# Patient Record
Sex: MALE | Race: WHITE | NOT HISPANIC OR LATINO | Employment: OTHER | URBAN - METROPOLITAN AREA
[De-identification: names, ages, dates, MRNs, and addresses within clinical notes are randomized per-mention and may not be internally consistent; named-entity substitution may affect disease eponyms.]

---

## 2017-01-06 ENCOUNTER — TELEPHONE (OUTPATIENT)
Dept: ORTHOPEDIC SURGERY | Facility: CLINIC | Age: 62
End: 2017-01-06

## 2017-12-04 ENCOUNTER — OFFICE VISIT (OUTPATIENT)
Dept: INTERNAL MEDICINE | Facility: CLINIC | Age: 62
End: 2017-12-04

## 2017-12-04 VITALS
WEIGHT: 171 LBS | SYSTOLIC BLOOD PRESSURE: 116 MMHG | DIASTOLIC BLOOD PRESSURE: 62 MMHG | BODY MASS INDEX: 25.33 KG/M2 | HEIGHT: 69 IN

## 2017-12-04 DIAGNOSIS — M54.16 LUMBAR RADICULOPATHY, ACUTE: Primary | ICD-10-CM

## 2017-12-04 PROCEDURE — 99213 OFFICE O/P EST LOW 20 MIN: CPT | Performed by: INTERNAL MEDICINE

## 2017-12-04 RX ORDER — PREDNISONE 10 MG/1
TABLET ORAL
Qty: 36 TABLET | Refills: 0 | Status: SHIPPED | OUTPATIENT
Start: 2017-12-04 | End: 2017-12-21

## 2017-12-04 NOTE — PROGRESS NOTES
"Subjective   Quan Holloway is a 62 y.o. male.     History of Present Illness   /62  Ht 69\" (175.3 cm)  Wt 171 lb (77.6 kg)  BMI 25.25 kg/m2  Quan Adrianv62 y.o.male presents to the office c/o intermittent rig ht hip pain shooting down below the knee with weight bearing.  S-ms started 11/29/2017. Precipitating event: none. Patient describes pain as sharp, severe and intermittent. Intensity of the s-ms: severe. Patient denies  numbness or tingling. States that the pain had been episodic. He has no pain at night in the bed. Able to lay on the right side. States that the worst pain is when he is standing. The pain improves with walking. The weight bearing causes the pain. Comfortable sitting, but has to find a position to be comfortable.  Patient  has had no similar episode in a past. Went to [facility].NO imaging studies done.  Treatments tried: Medrol dose pack , Cyclobenzaprine and Ibuprofen with relief.  PMH is significant for C spine DDD, PVD, current smoker.    No current outpatient prescriptions on file.    The following portions of the patient's history were reviewed and updated as appropriate: allergies, current medications, past family history, past medical history, past social history, past surgical history and problem list.    Review of Systems   Constitutional: Negative for chills and fever.   Eyes: Negative for pain and redness.   Respiratory: Negative for cough and shortness of breath.    Cardiovascular: Negative for chest pain and leg swelling.   Genitourinary: Positive for flank pain (right hip pain radiating down leg).   Neurological: Negative for dizziness and headaches.       Objective   Physical Exam   Constitutional: He is oriented to person, place, and time. He appears well-developed and well-nourished.   HENT:   Head: Normocephalic and atraumatic.   Right Ear: Tympanic membrane, external ear and ear canal normal.   Left Ear: Tympanic membrane, external ear and ear canal normal. "   Nose: Nose normal. Right sinus exhibits no maxillary sinus tenderness and no frontal sinus tenderness. Left sinus exhibits no maxillary sinus tenderness and no frontal sinus tenderness.   Mouth/Throat: Uvula is midline, oropharynx is clear and moist and mucous membranes are normal.   Eyes: Conjunctivae are normal. Pupils are equal, round, and reactive to light. Right eye exhibits no discharge. Left eye exhibits no discharge. No scleral icterus. Left eye nystagmus: lateral strobismus left eye.   Neck: Neck supple. No JVD present.   Cardiovascular: Normal rate, regular rhythm and normal heart sounds.  Exam reveals no gallop and no friction rub.    No murmur heard.  Pulmonary/Chest: Effort normal and breath sounds normal. He has no wheezes. He has no rales.   Musculoskeletal: He exhibits no edema.   Negative straight leg raising test, painless full ROM right hip and knee   Lymphadenopathy:     He has no cervical adenopathy.   Neurological: He is alert and oriented to person, place, and time. No cranial nerve deficit.   Skin: Skin is warm and dry. No rash noted.   Psychiatric: He has a normal mood and affect. His behavior is normal.   Vitals reviewed.      Assessment/Plan   Quan was seen today for hip pain.    Diagnoses and all orders for this visit:    Lumbar radiculopathy, acute  -     predniSONE (DELTASONE) 10 MG tablet; QID x 3d, then TID x 3d, then BID x 5d, then qd x5d    Acute lumbar radiculopathy - given explanations. Will stop Methylprednisolone and start Prednisone ( to be taken with meals) tapering dose. Continue muscle relaxant. Try heat, over the counter Salonpas patches or Tiger [place], or Icy Hot - apply to the right lower back. Try stretching exercise.

## 2017-12-04 NOTE — PATIENT INSTRUCTIONS
Acute lumbar radiculopathy - given explanations. Will stop Methylprednisolone and start Prednisone ( to be taken with meals) tapering dose. Continue muscle relaxant. Try heat, over the counter Salonpas patches or Tiger [place], or Icy Hot - apply to the right lower back. Try stretching exercise.

## 2017-12-11 ENCOUNTER — HOSPITAL ENCOUNTER (OUTPATIENT)
Dept: GENERAL RADIOLOGY | Facility: HOSPITAL | Age: 62
Discharge: HOME | End: 2017-12-11

## 2017-12-11 ENCOUNTER — OFFICE VISIT (OUTPATIENT)
Dept: INTERNAL MEDICINE | Facility: CLINIC | Age: 62
End: 2017-12-11

## 2017-12-11 ENCOUNTER — HOSPITAL ENCOUNTER (OUTPATIENT)
Dept: GENERAL RADIOLOGY | Facility: HOSPITAL | Age: 62
Discharge: HOME | End: 2017-12-11
Admitting: INTERNAL MEDICINE

## 2017-12-11 VITALS
WEIGHT: 172 LBS | BODY MASS INDEX: 25.4 KG/M2 | HEART RATE: 72 BPM | DIASTOLIC BLOOD PRESSURE: 100 MMHG | SYSTOLIC BLOOD PRESSURE: 162 MMHG | OXYGEN SATURATION: 97 %

## 2017-12-11 DIAGNOSIS — R39.16 BENIGN PROSTATIC HYPERPLASIA (BPH) WITH STRAINING ON URINATION: ICD-10-CM

## 2017-12-11 DIAGNOSIS — R03.0 ELEVATED BLOOD PRESSURE READING WITHOUT DIAGNOSIS OF HYPERTENSION: ICD-10-CM

## 2017-12-11 DIAGNOSIS — M54.16 LUMBAR RADICULOPATHY, ACUTE: ICD-10-CM

## 2017-12-11 DIAGNOSIS — N40.1 BENIGN PROSTATIC HYPERPLASIA (BPH) WITH STRAINING ON URINATION: ICD-10-CM

## 2017-12-11 DIAGNOSIS — M54.16 LUMBAR RADICULOPATHY, ACUTE: Primary | ICD-10-CM

## 2017-12-11 PROCEDURE — 99214 OFFICE O/P EST MOD 30 MIN: CPT | Performed by: INTERNAL MEDICINE

## 2017-12-11 PROCEDURE — 73502 X-RAY EXAM HIP UNI 2-3 VIEWS: CPT

## 2017-12-11 PROCEDURE — 72100 X-RAY EXAM L-S SPINE 2/3 VWS: CPT

## 2017-12-11 RX ORDER — TAMSULOSIN HYDROCHLORIDE 0.4 MG/1
1 CAPSULE ORAL NIGHTLY
Qty: 30 CAPSULE | Refills: 3 | Status: SHIPPED | OUTPATIENT
Start: 2017-12-11 | End: 2018-03-06 | Stop reason: SDUPTHER

## 2017-12-11 RX ORDER — MELOXICAM 15 MG/1
15 TABLET ORAL DAILY
Qty: 30 TABLET | Refills: 3 | Status: SHIPPED | OUTPATIENT
Start: 2017-12-11 | End: 2017-12-21

## 2017-12-11 NOTE — PROGRESS NOTES
Subjective   Quan Holloway is a 62 y.o. male.     History of Present Illness   Quan Holloway 62 y.o. male presents today for lumbar radiculopathy f/u. Last was seen on 12/04/2017 and on that visit medication was changed due to new onset of symptoms.We had started prednisone.  Patient is compliant with treatment and reports side effects.He states that his urination is difficult with some pain, also insomnia. Patient reports no change in symptoms with medication change. After very mild initial improvement his lower back and right leg pain got much worse since 4 days ago. As of yesterday the pain goes below the right knee.He states that the pain gets worse as he bears weight. Unable to find comfortable position. Minimal improvement with topical use of OTC Tiger balm.  The following portions of the patient's history were reviewed and updated as appropriate: allergies, current medications, past family history, past medical history, past social history, past surgical history and problem list.    Review of Systems   HENT: Negative for congestion, drooling, ear discharge and ear pain.    Cardiovascular: Negative for chest pain, palpitations and leg swelling.   Musculoskeletal: Positive for back pain and gait problem. Negative for neck pain and neck stiffness.   Neurological: Negative for dizziness, syncope, numbness and headaches.   Psychiatric/Behavioral: Negative for agitation, behavioral problems and hallucinations.       Objective   Physical Exam   Constitutional: He is oriented to person, place, and time. He appears well-developed and well-nourished.   HENT:   Head: Normocephalic and atraumatic.   Right Ear: Tympanic membrane, external ear and ear canal normal.   Left Ear: Tympanic membrane, external ear and ear canal normal.   Nose: Nose normal. Right sinus exhibits no maxillary sinus tenderness and no frontal sinus tenderness. Left sinus exhibits no maxillary sinus tenderness and no frontal sinus tenderness.    Mouth/Throat: Uvula is midline, oropharynx is clear and moist and mucous membranes are normal.   Eyes: Conjunctivae and EOM are normal. Pupils are equal, round, and reactive to light. Right eye exhibits no discharge. Left eye exhibits no discharge. No scleral icterus.   Neck: Neck supple. No JVD present.   Cardiovascular: Normal rate, regular rhythm and normal heart sounds.  Exam reveals no gallop and no friction rub.    No murmur heard.  Pulmonary/Chest: Effort normal and breath sounds normal. He has no wheezes. He has no rales.   Musculoskeletal: He exhibits no edema.   Lymphadenopathy:     He has no cervical adenopathy.   Neurological: He is alert and oriented to person, place, and time. No cranial nerve deficit.   Skin: Skin is warm and dry. No rash noted.   Psychiatric: He has a normal mood and affect. His behavior is normal.   Vitals reviewed.      Assessment/Plan   Quan was seen today for back pain.    Diagnoses and all orders for this visit:    Lumbar radiculopathy, acute  -     XR Hip With or Without Pelvis 2 - 3 View Left  -     XR Spine Lumbar 4+ View  -     meloxicam (MOBIC) 15 MG tablet; Take 1 tablet by mouth Daily.      1. Lumbar radiculopathy vs ileo-tibial s-m. Had failed oral prednisone. Will treat with Meloxicam and refer to PT. Will check right hip and lumbar spine x-rays. Continue topical applications of over the counter Tiger [place] and heat. Stay off work.  2. High BP - due to use of Prednisone and pain. Low salt diet. Reevaluate in a week.  3. BPH with prostatismus - in a past had good response to Tamsulosin, will restart.

## 2017-12-18 ENCOUNTER — TELEPHONE (OUTPATIENT)
Dept: INTERNAL MEDICINE | Facility: CLINIC | Age: 62
End: 2017-12-18

## 2017-12-18 NOTE — TELEPHONE ENCOUNTER
Patient was informed about possible MRI if pain has not improved also referral for Dr Dowling with orthopedic group for spine issues. Patient states he has an appointment on Thursday with Dr gilliland and will discuss then. FMLA, short term disability paperwork is at my desk to hold for Dr gilliland to review. Patient is aware if paperwork can not be filled by Dr Gilliland he may be referred to a disability speciality doctor.

## 2017-12-18 NOTE — TELEPHONE ENCOUNTER
----- Message from Aziza Maki sent at 12/18/2017  9:25 AM EST -----  Pt dropped off forms for short-term disability to be filled out. Let him know  would look over them and get back with him if there were any questions. He has an appt with her on Thursday. His call back is [phone]. Thank you!

## 2017-12-19 DIAGNOSIS — M54.16 LUMBAR RADICULOPATHY, ACUTE: Primary | ICD-10-CM

## 2017-12-21 ENCOUNTER — OFFICE VISIT (OUTPATIENT)
Dept: INTERNAL MEDICINE | Facility: CLINIC | Age: 62
End: 2017-12-21

## 2017-12-21 VITALS
HEIGHT: 69 IN | SYSTOLIC BLOOD PRESSURE: 120 MMHG | DIASTOLIC BLOOD PRESSURE: 70 MMHG | BODY MASS INDEX: 25.62 KG/M2 | WEIGHT: 173 LBS | RESPIRATION RATE: 14 BRPM

## 2017-12-21 DIAGNOSIS — R39.16 BENIGN PROSTATIC HYPERPLASIA (BPH) WITH STRAINING ON URINATION: ICD-10-CM

## 2017-12-21 DIAGNOSIS — M25.361 KNEE INSTABILITY, RIGHT: ICD-10-CM

## 2017-12-21 DIAGNOSIS — M54.16 LUMBAR RADICULOPATHY, ACUTE: Primary | ICD-10-CM

## 2017-12-21 DIAGNOSIS — N40.1 BENIGN PROSTATIC HYPERPLASIA (BPH) WITH STRAINING ON URINATION: ICD-10-CM

## 2017-12-21 PROBLEM — R03.0 ELEVATED BLOOD PRESSURE READING WITHOUT DIAGNOSIS OF HYPERTENSION: Status: RESOLVED | Noted: 2017-12-11 | Resolved: 2017-12-21

## 2017-12-21 PROCEDURE — 99214 OFFICE O/P EST MOD 30 MIN: CPT | Performed by: INTERNAL MEDICINE

## 2017-12-21 NOTE — PROGRESS NOTES
Subjective   Quan Holloway is a 62 y.o. male.     History of Present Illness   Quan Holloway 62 y.o. male presents today for lower back pain f/u. Last was seen on 12/11/2017 and on that visit medication was changed due to inadequate control.We had started Meloxiacan and had referred patient to PT..  Patient is compliant with treatment and denies  side effects. Patient reports no change in symptoms with medication change. He continues suffering with right lower back and right lateral leg pain. The only comfortable position is crowching on the floor with right hip and knee fully flexed and lower spine extended. Had traction and dry needling with very temporary relief - usually feels great for 15 min, but then on his way home pain returns. States that the last night was the first night when he was able to sleep all night. No tingling or numbness. Patient states that he is not able to stand and bear weight for any length of time. Had 5 sessions of PT so far. The pain is dull ache and shooting down the leg. Some instability in the right knee, that he attributes to the pain.  Quan Holloway 62 y.o. male presents today for BPH with prostatismus f/u. Last was seen on 12/11/2017 and on that visit medication was changed due to new onset of symptoms.We had started Tamsulosin.  Patient is compliant with treatment and denies  side effects. Patient reports resolution of the symptoms.     The following portions of the patient's history were reviewed and updated as appropriate: allergies, current medications, past family history, past medical history, past social history, past surgical history and problem list.    Review of Systems   Constitutional: Negative for chills and fever.   Eyes: Negative for pain and redness.   Respiratory: Negative for cough and shortness of breath.    Cardiovascular: Negative for chest pain and leg swelling.   Musculoskeletal: Positive for back pain, gait problem and myalgias.   Neurological: Negative  for dizziness and headaches.       Objective   Physical Exam   Constitutional: He is oriented to person, place, and time. He appears well-developed and well-nourished.   HENT:   Head: Normocephalic and atraumatic.   Right Ear: Tympanic membrane, external ear and ear canal normal.   Left Ear: Tympanic membrane, external ear and ear canal normal.   Nose: Nose normal. Right sinus exhibits no maxillary sinus tenderness and no frontal sinus tenderness. Left sinus exhibits no maxillary sinus tenderness and no frontal sinus tenderness.   Mouth/Throat: Uvula is midline, oropharynx is clear and moist and mucous membranes are normal.   Eyes: Conjunctivae and EOM are normal. Pupils are equal, round, and reactive to light. Right eye exhibits no discharge. Left eye exhibits no discharge. No scleral icterus.   Neck: Neck supple. No JVD present.   Cardiovascular: Normal rate, regular rhythm and normal heart sounds.  Exam reveals no gallop and no friction rub.    No murmur heard.  Pulmonary/Chest: Effort normal and breath sounds normal. He has no wheezes. He has no rales.   Musculoskeletal: He exhibits no edema.   Lymphadenopathy:     He has no cervical adenopathy.   Neurological: He is alert and oriented to person, place, and time. No cranial nerve deficit.   Skin: Skin is warm and dry. No rash noted.   Psychiatric: He has a normal mood and affect. His behavior is normal.   Vitals reviewed.      Assessment/Plan   Quan was seen today for leg pain.    Diagnoses and all orders for this visit:    Lumbar radiculopathy, acute  -     Ambulatory Referral to Orthopedic Surgery    Benign prostatic hyperplasia (BPH) with straining on urination    Knee instability, right  -     XR Knee Standing Right      1. Lumbar radiculopathy with poor response to treatment. Awaiting MRI. Lack of response to medication and to the PT is of concern. Will refer to ortho. Will stop Meloxicam and try Diclofenac.  2. Instability in the right knee - will check  x-ray to r/o osteoarthritis.  3. BPH - responded well to medication, continue Tamsulosin.

## 2017-12-26 ENCOUNTER — HOSPITAL ENCOUNTER (OUTPATIENT)
Dept: MRI IMAGING | Facility: HOSPITAL | Age: 62
Discharge: HOME | End: 2017-12-26
Admitting: INTERNAL MEDICINE

## 2017-12-26 ENCOUNTER — HOSPITAL ENCOUNTER (OUTPATIENT)
Dept: GENERAL RADIOLOGY | Facility: HOSPITAL | Age: 62
Discharge: HOME | End: 2017-12-26

## 2017-12-26 DIAGNOSIS — M54.16 LUMBAR RADICULOPATHY, ACUTE: ICD-10-CM

## 2017-12-26 PROCEDURE — 72148 MRI LUMBAR SPINE W/O DYE: CPT

## 2017-12-26 PROCEDURE — 73560 X-RAY EXAM OF KNEE 1 OR 2: CPT

## 2017-12-27 DIAGNOSIS — M54.16 LUMBAR RADICULOPATHY, ACUTE: Primary | ICD-10-CM

## 2018-01-02 ENCOUNTER — OFFICE VISIT (OUTPATIENT)
Dept: INTERNAL MEDICINE | Facility: CLINIC | Age: 63
End: 2018-01-02

## 2018-01-02 VITALS
DIASTOLIC BLOOD PRESSURE: 68 MMHG | SYSTOLIC BLOOD PRESSURE: 118 MMHG | WEIGHT: 173 LBS | BODY MASS INDEX: 25.62 KG/M2 | HEIGHT: 69 IN

## 2018-01-02 DIAGNOSIS — M54.16 LUMBAR RADICULOPATHY, ACUTE: Primary | ICD-10-CM

## 2018-01-02 PROCEDURE — 99213 OFFICE O/P EST LOW 20 MIN: CPT | Performed by: INTERNAL MEDICINE

## 2018-01-02 RX ORDER — PREDNISONE 10 MG/1
20 TABLET ORAL 2 TIMES DAILY WITH MEALS
Qty: 20 TABLET | Refills: 0 | Status: SHIPPED | OUTPATIENT
Start: 2018-01-02 | End: 2018-01-12

## 2018-01-02 RX ORDER — GABAPENTIN 100 MG/1
100 CAPSULE ORAL 3 TIMES DAILY
Qty: 90 CAPSULE | Refills: 0 | Status: SHIPPED | OUTPATIENT
Start: 2018-01-02 | End: 2018-01-12

## 2018-01-02 NOTE — PATIENT INSTRUCTIONS
Lumbar radiculopathy - had failed PT, NSAIDs, steroids and muscle relaxant. MRI reviewed. Has appointment with Samantha on 01/10/2018, I had recommended for the patient to call his office daily and see if they have cancellation list. Will try one more round of steroids. Try over the counter Salonpas patches or creams. Try Gabapentin 3 times a day, start at night and in the late afternoon. Warned that medication might make him sleepy.

## 2018-01-02 NOTE — PROGRESS NOTES
Subjective   Quan Holloway is a 62 y.o. male. Here for lumbar radiculopathy    History of Present Illness   Quan Holloway 62 y.o. male presents today for lumbar radiculopathy f/u. Last was seen on 12/21/2017 and on that visit medication was changed due to plans for epidural ingections.We had discontinued Diclofenac due to plans for epidurals and due to lack of efefct..  Patient is compliant with treatment . He is not able to sleep at night due to the pain. Complains of the severe pain, unrelenting, worse as the day progresses. Pain radiated down the right eleg.  The following portions of the patient's history were reviewed and updated as appropriate: allergies, current medications, past family history, past medical history, past social history, past surgical history and problem list.    Review of Systems   Constitutional: Negative for chills and fever.   Eyes: Negative for pain and redness.   Respiratory: Negative for cough and shortness of breath.    Cardiovascular: Negative for chest pain and leg swelling.   Musculoskeletal: Positive for joint swelling (right knee pain).   Neurological: Negative for dizziness and headaches.       Objective   Physical Exam   Constitutional: He is oriented to person, place, and time. He appears well-developed and well-nourished.   HENT:   Head: Normocephalic and atraumatic.   Right Ear: Tympanic membrane, external ear and ear canal normal.   Left Ear: Tympanic membrane, external ear and ear canal normal.   Nose: Nose normal. Right sinus exhibits no maxillary sinus tenderness and no frontal sinus tenderness. Left sinus exhibits no maxillary sinus tenderness and no frontal sinus tenderness.   Mouth/Throat: Uvula is midline, oropharynx is clear and moist and mucous membranes are normal.   Eyes: Conjunctivae and EOM are normal. Pupils are equal, round, and reactive to light. Right eye exhibits no discharge. Left eye exhibits no discharge. No scleral icterus.   Neck: Neck supple. No  JVD present.   Cardiovascular: Normal rate, regular rhythm and normal heart sounds.  Exam reveals no gallop and no friction rub.    No murmur heard.  Pulmonary/Chest: Effort normal and breath sounds normal. He has no wheezes. He has no rales.   Musculoskeletal: He exhibits no edema.   Lymphadenopathy:     He has no cervical adenopathy.   Neurological: He is alert and oriented to person, place, and time. No cranial nerve deficit.   Skin: Skin is warm and dry. No rash noted.   Psychiatric: He has a normal mood and affect. His behavior is normal.   Vitals reviewed.      Assessment/Plan   Diagnoses and all orders for this visit:    Lumbar radiculopathy, acute    Other orders  -     predniSONE (DELTASONE) 10 MG tablet; Take 2 tablets by mouth 2 (Two) Times a Day With Meals.    Lumbar radiculopathy - had failed PT, NSAIDs, steroids and muscle relaxant. MRI reviewed. Has appointment with Samantha on 01/10/2018, I had recommended for the patient to call his office daily and see if they have cancellation list. Will try one more round of steroids. Try over the counter Salonpas patches or creams. Try Gabapentin 3 times a day, start at night and in the late afternoon. Warned that medication might make him sleepy.

## 2018-01-12 ENCOUNTER — ANESTHESIA EVENT (OUTPATIENT)
Dept: PAIN MEDICINE | Facility: HOSPITAL | Age: 63
End: 2018-01-12

## 2018-01-12 ENCOUNTER — ANESTHESIA (OUTPATIENT)
Dept: PAIN MEDICINE | Facility: HOSPITAL | Age: 63
End: 2018-01-12

## 2018-01-12 ENCOUNTER — TRANSCRIBE ORDERS (OUTPATIENT)
Dept: PAIN MEDICINE | Facility: HOSPITAL | Age: 63
End: 2018-01-12

## 2018-01-12 ENCOUNTER — HOSPITAL ENCOUNTER (OUTPATIENT)
Dept: GENERAL RADIOLOGY | Facility: HOSPITAL | Age: 63
Discharge: HOME | End: 2018-01-12

## 2018-01-12 ENCOUNTER — HOSPITAL ENCOUNTER (OUTPATIENT)
Dept: PAIN MEDICINE | Facility: HOSPITAL | Age: 63
Discharge: HOME | End: 2018-01-12
Admitting: INTERNAL MEDICINE

## 2018-01-12 VITALS
RESPIRATION RATE: 16 BRPM | TEMPERATURE: 97.9 F | SYSTOLIC BLOOD PRESSURE: 116 MMHG | OXYGEN SATURATION: 98 % | HEIGHT: 68 IN | BODY MASS INDEX: 26.67 KG/M2 | DIASTOLIC BLOOD PRESSURE: 75 MMHG | HEART RATE: 68 BPM | WEIGHT: 176 LBS

## 2018-01-12 DIAGNOSIS — R52 PAIN: ICD-10-CM

## 2018-01-12 DIAGNOSIS — M54.16 LUMBAR RADICULOPATHY, ACUTE: ICD-10-CM

## 2018-01-12 DIAGNOSIS — M54.16 LUMBAR RADICULOPATHY, ACUTE: Primary | ICD-10-CM

## 2018-01-12 PROCEDURE — 77003 FLUOROGUIDE FOR SPINE INJECT: CPT

## 2018-01-12 PROCEDURE — 25010000002 METHYLPREDNISOLONE PER 80 MG: Performed by: ANESTHESIOLOGY

## 2018-01-12 PROCEDURE — C1755 CATHETER, INTRASPINAL: HCPCS

## 2018-01-12 RX ORDER — SODIUM CHLORIDE 0.9 % (FLUSH) 0.9 %
1-10 SYRINGE (ML) INJECTION AS NEEDED
Status: DISCONTINUED | OUTPATIENT
Start: 2018-01-12 | End: 2018-01-13 | Stop reason: HOSPADM

## 2018-01-12 RX ORDER — LIDOCAINE HYDROCHLORIDE 10 MG/ML
1 INJECTION, SOLUTION INFILTRATION; PERINEURAL ONCE
Status: DISCONTINUED | OUTPATIENT
Start: 2018-01-12 | End: 2018-01-13 | Stop reason: HOSPADM

## 2018-01-12 RX ORDER — MIDAZOLAM HYDROCHLORIDE 1 MG/ML
1 INJECTION INTRAMUSCULAR; INTRAVENOUS
Status: DISCONTINUED | OUTPATIENT
Start: 2018-01-12 | End: 2018-01-13 | Stop reason: HOSPADM

## 2018-01-12 RX ORDER — FENTANYL CITRATE 50 UG/ML
50 INJECTION, SOLUTION INTRAMUSCULAR; INTRAVENOUS
Status: DISCONTINUED | OUTPATIENT
Start: 2018-01-12 | End: 2018-01-13 | Stop reason: HOSPADM

## 2018-01-12 RX ORDER — METHYLPREDNISOLONE ACETATE 80 MG/ML
80 INJECTION, SUSPENSION INTRA-ARTICULAR; INTRALESIONAL; INTRAMUSCULAR; SOFT TISSUE ONCE
Status: COMPLETED | OUTPATIENT
Start: 2018-01-12 | End: 2018-01-12

## 2018-01-12 RX ADMIN — METHYLPREDNISOLONE ACETATE 80 MG: 80 INJECTION, SUSPENSION INTRA-ARTICULAR; INTRALESIONAL; INTRAMUSCULAR; SOFT TISSUE at 08:36

## 2018-01-12 NOTE — H&P
"[facility]    History and Physical    Patient Name: Quan Holloway  :  1955  MRN:  [id number]  Date of Admission: 2018    Subjective     Patient is a 62 y.o. male presents with chief complaint of acute, constant, severe, 5-10/10, stabbing, ? etiology low back and leg: right pain.  Onset of symptoms was gradual starting 2 months ago.  Symptoms are associated/aggravated by activity. Symptoms improve with relaxation and lying down    The following portions of the patients history were reviewed and updated as appropriate: current medications, allergies, past medical history, past surgical history, past family history, past social history and problem list                Objective     Past Medical History:   Past Medical History:   Diagnosis Date   • Arthritis    • Blindness of left eye     since birth   • Low back pain      Past Surgical History:   Past Surgical History:   Procedure Laterality Date   • APPENDECTOMY     • SHOULDER SURGERY       Family History:   Family History   Problem Relation Age of Onset   • Brain cancer Mother    • Prostate cancer Father    • Other Father      PAD   • Stroke Father      Social History:   Social History   Substance Use Topics   • Smoking status: Current Every Day Smoker     Packs/day: 1.00     Years: 48.00   • Smokeless tobacco: Never Used   • Alcohol use 0.6 oz/week     1 Standard drinks or equivalent per week      Comment: 1 DRINK PER WEEK       Vital Signs Range for the last 24 hours  Temperature: Temp:  [36.6 °C (97.9 °F)] 36.6 °C (97.9 °F)   Temp Source: Temp src: Oral   BP: BP: (141)/(88) 141/88   Pulse: Heart Rate:  [75] 75   Respirations: Resp:  [16] 16   SPO2: SpO2:  [95 %] 95 %   O2 Amount (l/min):     O2 Devices O2 Device: room air   Weight: Weight:  [79.8 kg (176 lb)] 79.8 kg (176 lb)     Flowsheet Rows         First Filed Value    Admission Height  172.7 cm (68\") Documented at 2018 0806    Admission Weight  79.8 kg (176 lb) Documented at " 01/12/2018 0806          --------------------------------------------------------------------------------    Current Outpatient Prescriptions   Medication Sig Dispense Refill   • tamsulosin (FLOMAX) 0.4 MG capsule 24 hr capsule Take 1 capsule by mouth Every Night. 30 capsule 3     Current Facility-Administered Medications   Medication Dose Route Frequency Provider Last Rate Last Dose   • fentaNYL citrate (PF) (SUBLIMAZE) injection 50 mcg  50 mcg Intravenous Q5 Min PRN Quique Calero MD       • iopamidol (ISOVUE-M 200) injection 41%  2 mL Injection Once in imaging Quique Calero MD       • lidocaine (XYLOCAINE) 1 % injection 1 mL  1 mL Intradermal Once Quique Calero MD       • methylPREDNISolone acetate (DEPO-medrol) injection 80 mg  80 mg Intramuscular Once Quique Calero MD       • midazolam (VERSED) injection 1 mg  1 mg Intravenous Q5 Min PRN Quique Calero MD       • sodium chloride 0.9 % flush 1-10 mL  1-10 mL Intravenous PRN Quique Calero MD           --------------------------------------------------------------------------------  Assessment/Plan      Anesthesia Evaluation     Patient summary reviewed and Nursing notes reviewed          Airway   Mallampati: II  Dental - normal exam     Pulmonary - negative pulmonary ROS and normal exam   Cardiovascular - negative cardio ROS and normal exam        Neuro/Psych- negative ROS  (+)   right straight leg raise test,    GI/Hepatic/Renal/Endo - negative ROS     Musculoskeletal (-) negative ROS and normal exam    Abdominal  - normal exam   Substance History - negative use     OB/GYN negative ob/gyn ROS         Other                                           Diagnosis and Plan    Treatment Plan  ASA 2      Procedures: Lumbar Epidural Steroid Injection(LESI), With fluoroscopy,       Anesthetic plan and risks discussed with patient.          Diagnosis     * Lumbar disc displacement without myelopathy [M51.26]     * Lumbar radiculopathy [M54.16]     * Lumbar  "degenerative disc disease [M51.36]            CHIEF COMPLAINT:       HISTORY OF PRESENT ILLNESS:      PAST MEDICAL HISTORY:  Current Outpatient Prescriptions on File Prior to Encounter   Medication Sig Dispense Refill   • tamsulosin (FLOMAX) 0.4 MG capsule 24 hr capsule Take 1 capsule by mouth Every Night. 30 capsule 3   • [DISCONTINUED] diclofenac sodium (VOTAREN XR) 100 MG 24 hr tablet Take 1 tablet by mouth Daily. 30 tablet 0   • [DISCONTINUED] gabapentin (NEURONTIN) 100 MG capsule Take 1 capsule by mouth 3 (Three) Times a Day. 90 capsule 0   • [DISCONTINUED] predniSONE (DELTASONE) 10 MG tablet Take 2 tablets by mouth 2 (Two) Times a Day With Meals. 20 tablet 0     No current facility-administered medications on file prior to encounter.        Past Medical History:   Diagnosis Date   • Arthritis    • Blindness of left eye     since birth   • Low back pain          SOCIAL HISTORY:  No tobacco    REVIEW OF SYSTEMS:  No hematologic infectious or constitutional symptoms  Other review of systems non-contributory    PHYSICAL EXAM:  /88 (BP Location: Left arm, Patient Position: Sitting)  Pulse 75  Temp 36.6 °C (97.9 °F) (Oral)   Resp 16  Ht 172.7 cm (68\")  Wt 79.8 kg (176 lb)  SpO2 95%  BMI 26.76 kg/m2  Well-developed well-nourished no acute distress  Extra ocular movements intact  Mallampati class 2 airway  Cardiac:  Regular rate and rhythm  Lungs:  Clear to auscultation bilaterally with good effort  Alert and oriented ×3  Deep tendon reflexes normal in the bilateral patella  Positive straight leg raise bilaterally  5 out of 5 strength bilateral upper and lower extremities  Lumbar spine without obvious deformities ecchymoses  Lumbar spine nontender to palpation      DIAGNOSIS:  Post-Op Diagnosis Codes:     * Lumbar disc displacement without myelopathy [M51.26]     * Lumbar radiculopathy [M54.16]     * Lumbar degenerative disc disease [M51.36]    PLAN:  1.  Lumbar epidural steroid injections, up to 3, " spaced 1-2 weeks apart.  If pain control is acceptable after 1 or 2 injections, it was discussed with the patient that they may return for the subsequent injections if and when their pain returns.  The risks were discussed with the patient including failure of relief, worsening pain, Headache (post dural puncture headache), bleeding (epidural hematoma) and infection (epidural abscess or skin infection).  2.  Physical therapy exercises at home as prescribed by physical therapy or from the pain clinic handout (given to the patient).  Continuation of these exercises every day, or multiple times per week, even when the patient has good pain relief, was stressed to the patient as a preventative measure to decrease the frequency and severity of future pain episodes.  3.  Continue pain medicines as already prescribed.  If patient not currently taking any, it is recommended to begin Acetaminophen 1000 mg po q 8 hours.  If other medicines containing Acetaminophen are currently prescribed, maintain daily dose at 3000 mg.    4.  If they can tolerate NSAIDS, it is recommended to take Ibuprofen 600 mg po q 6 hours for 7 days during pain exacerbations.  Alternatively, they may substitute an NSAID of their choice (e.g. Aleve).  This may be taken at the same time as Acetaminophen.  5.  Heat and ice to the affected area as tolerated for pain control.  It was discussed that heating pads can cause burns.  6.  Daily low impact exercise such as walking or water exercise was recommended to maintain overall health and aid in weight control.   7.  Follow up as needed for subsequent injections.  8.  Patient was counseled to abstain from tobacco products.

## 2018-01-12 NOTE — ANESTHESIA PROCEDURE NOTES
PAIN Epidural block    Patient location during procedure: pain clinic  Start Time: 1/12/2018 8:30 AM  Stop Time: 1/12/2018 8:37 AM  Indication:at surgeon's request and procedure for pain  Performed By  Anesthesiologist: MELL PIERRE  Preanesthetic Checklist  Completed: patient identified, site marked, surgical consent, pre-op evaluation, timeout performed, IV checked, risks and benefits discussed and monitors and equipment checked  Additional Notes  Dx:  Post-Op Diagnosis Codes:     * Lumbar disc displacement without myelopathy (M51.26)     * Lumbar radiculopathy (M54.16)     * Lumbar degenerative disc disease (M51.36)  80 mg depomedrol in epid    Plan : return to clinic as needed  Prep:  Pt Position:prone (prone)  Sterile Tech:cap, gloves, mask and sterile barrier  Prep:chlorhexidine gluconate and isopropyl alcohol  Monitoring:blood pressure monitoring, EKG and continuous pulse oximetry  Procedure:  Sedation: no   Approach:right paramedian  Guidance: fluoroscopy and c arm pa and lat and loss of resistance  Location:lumbar  Level:4-5 (interlaminar)  Needle Type:Lisa  Needle Gauge:20  Aspiration:negative  Medications:  Depomedrol:80 mg  Preservative Free Saline:3mL    Post Assessment:  Post-procedure: bandaid.  Pt Tolerance:patient tolerated the procedure well with no apparent complications  Complications:no

## 2018-01-21 ENCOUNTER — HOSPITAL ENCOUNTER (EMERGENCY)
Facility: HOSPITAL | Age: 63
Discharge: HOME | End: 2018-01-21
Attending: EMERGENCY MEDICINE | Admitting: EMERGENCY MEDICINE

## 2018-01-21 ENCOUNTER — APPOINTMENT (OUTPATIENT)
Dept: GENERAL RADIOLOGY | Facility: HOSPITAL | Age: 63
End: 2018-01-21

## 2018-01-21 ENCOUNTER — HOSPITAL ENCOUNTER (EMERGENCY)
Facility: HOSPITAL | Age: 63
Discharge: LEFT BEFORE DISCHARGE | End: 2018-01-21

## 2018-01-21 VITALS
TEMPERATURE: 98 F | WEIGHT: 170 LBS | OXYGEN SATURATION: 99 % | HEART RATE: 74 BPM | RESPIRATION RATE: 18 BRPM | SYSTOLIC BLOOD PRESSURE: 137 MMHG | BODY MASS INDEX: 25.76 KG/M2 | DIASTOLIC BLOOD PRESSURE: 64 MMHG | HEIGHT: 68 IN

## 2018-01-21 VITALS
TEMPERATURE: 97.9 F | DIASTOLIC BLOOD PRESSURE: 76 MMHG | SYSTOLIC BLOOD PRESSURE: 125 MMHG | OXYGEN SATURATION: 96 % | WEIGHT: 170 LBS | HEART RATE: 68 BPM | HEIGHT: 69 IN | RESPIRATION RATE: 18 BRPM | BODY MASS INDEX: 25.18 KG/M2

## 2018-01-21 DIAGNOSIS — M54.41 LOW BACK PAIN WITH RIGHT-SIDED SCIATICA, UNSPECIFIED BACK PAIN LATERALITY, UNSPECIFIED CHRONICITY: Primary | ICD-10-CM

## 2018-01-21 PROCEDURE — 96375 TX/PRO/DX INJ NEW DRUG ADDON: CPT

## 2018-01-21 PROCEDURE — 99211 OFF/OP EST MAY X REQ PHY/QHP: CPT

## 2018-01-21 PROCEDURE — 99284 EMERGENCY DEPT VISIT MOD MDM: CPT

## 2018-01-21 PROCEDURE — 96372 THER/PROPH/DIAG INJ SC/IM: CPT

## 2018-01-21 PROCEDURE — 96374 THER/PROPH/DIAG INJ IV PUSH: CPT

## 2018-01-21 PROCEDURE — 72110 X-RAY EXAM L-2 SPINE 4/>VWS: CPT

## 2018-01-21 PROCEDURE — 25010000002 HYDROMORPHONE PER 4 MG: Performed by: PHYSICIAN ASSISTANT

## 2018-01-21 PROCEDURE — 25010000002 DEXAMETHASONE SODIUM PHOSPHATE 20 MG/5ML SOLUTION: Performed by: PHYSICIAN ASSISTANT

## 2018-01-21 PROCEDURE — 25010000002 ONDANSETRON PER 1 MG: Performed by: PHYSICIAN ASSISTANT

## 2018-01-21 RX ORDER — ONDANSETRON 2 MG/ML
4 INJECTION INTRAMUSCULAR; INTRAVENOUS ONCE
Status: COMPLETED | OUTPATIENT
Start: 2018-01-21 | End: 2018-01-21

## 2018-01-21 RX ORDER — OXYCODONE AND ACETAMINOPHEN 7.5; 325 MG/1; MG/1
1 TABLET ORAL EVERY 6 HOURS PRN
Qty: 15 TABLET | Refills: 0 | Status: SHIPPED | OUTPATIENT
Start: 2018-01-21 | End: 2018-01-23 | Stop reason: SDUPTHER

## 2018-01-21 RX ORDER — PREDNISONE 20 MG/1
20 TABLET ORAL DAILY
Qty: 10 TABLET | Refills: 0 | Status: SHIPPED | OUTPATIENT
Start: 2018-01-21 | End: 2018-02-05

## 2018-01-21 RX ORDER — DEXAMETHASONE SODIUM PHOSPHATE 4 MG/ML
10 INJECTION, SOLUTION INTRA-ARTICULAR; INTRALESIONAL; INTRAMUSCULAR; INTRAVENOUS; SOFT TISSUE ONCE
Status: COMPLETED | OUTPATIENT
Start: 2018-01-21 | End: 2018-01-21

## 2018-01-21 RX ORDER — SODIUM CHLORIDE 0.9 % (FLUSH) 0.9 %
10 SYRINGE (ML) INJECTION AS NEEDED
Status: DISCONTINUED | OUTPATIENT
Start: 2018-01-21 | End: 2018-01-21 | Stop reason: HOSPADM

## 2018-01-21 RX ADMIN — DEXAMETHASONE SODIUM PHOSPHATE 10 MG: 4 INJECTION, SOLUTION INTRAMUSCULAR; INTRAVENOUS at 05:35

## 2018-01-21 RX ADMIN — ONDANSETRON 4 MG: 2 INJECTION INTRAMUSCULAR; INTRAVENOUS at 05:36

## 2018-01-21 RX ADMIN — HYDROMORPHONE HYDROCHLORIDE 1 MG: 1 INJECTION, SOLUTION INTRAMUSCULAR; INTRAVENOUS; SUBCUTANEOUS at 05:34

## 2018-01-21 NOTE — ED NOTES
Pt reports low back pain that started about 5 hours ago with a h/o sciatica     Jhony Saul RN  01/21/18 1754

## 2018-01-21 NOTE — ED TRIAGE NOTES
Pt signed in earlier for same complaint and before a room was ready he LWBS. This RN tried to convince him to stay for completion of treatment but pt refused at that time. Pt returned to finish his evaluation.

## 2018-01-21 NOTE — ED PROVIDER NOTES
" EMERGENCY DEPARTMENT ENCOUNTER    CHIEF COMPLAINT  Chief Complaint: Lower back pain  History given by: Patient and family  History limited by: Nothing  Room Number: 12/12  PMD: Kelsea Tinsley MD      HPI:  Pt is a 62 y.o. male who presents complaining of lower back pain onset 2100 yesterday. The pt was working and tried to pick an object up, when he experienced lower back pain. The pt states the pain radiates to the right leg. The pt has a hx of sciatica. The pt's daughter was used as a  for the pt.    Duration: Since 2100 yesterday  Onset: Sudden  Timing: Constant  Location: Lower back  Radiation: Right leg  Quality: \"Pain\"  Intensity/Severity: Moderate  Progression: Unchanged  Associated Symptoms: None stated  Aggravating Factors: None stated  Alleviating Factors: None stated  Previous Episodes: The pt has a hx of sciatica.  Treatment before arrival: Nothing    PAST MEDICAL HISTORY  Active Ambulatory Problems     Diagnosis Date Noted   • Osteoarthritis of spine with radiculopathy, cervical region 03/10/2016   • Benign prostatic hyperplasia (BPH) with straining on urination 11/29/2016   • HLD (hyperlipidemia) 11/29/2016   • Dupuytren's contracture 12/07/2016   • Lumbar radiculopathy, acute 12/07/2016   • Claudication of both lower extremities 12/07/2016   • PAD (peripheral artery disease) 12/20/2016   • Knee instability, right 12/21/2017     Resolved Ambulatory Problems     Diagnosis Date Noted   • Adhesive capsulitis of right shoulder 03/10/2016   • Adhesive capsulitis 03/31/2016   • Shoulder, capsulitis, adhesive 04/21/2016   • Pain 05/12/2016   • Bursitis/tendonitis, shoulder 07/14/2016   • Pain 12/18/2016   • Elevated blood pressure reading without diagnosis of hypertension 12/11/2017     Past Medical History:   Diagnosis Date   • Arthritis    • Blindness of left eye    • Low back pain        PAST SURGICAL HISTORY  Past Surgical History:   Procedure Laterality Date   • APPENDECTOMY  2002   • " SHOULDER SURGERY         FAMILY HISTORY  Family History   Problem Relation Age of Onset   • Brain cancer Mother    • Prostate cancer Father    • Other Father      PAD   • Stroke Father        SOCIAL HISTORY  Social History     Social History   • Marital status:      Spouse name: N/A   • Number of children: N/A   • Years of education: N/A     Occupational History   • Not on file.     Social History Main Topics   • Smoking status: Current Every Day Smoker     Packs/day: 1.00     Years: 48.00   • Smokeless tobacco: Never Used   • Alcohol use 0.6 oz/week     1 Standard drinks or equivalent per week      Comment: 1 DRINK PER WEEK   • Drug use: No   • Sexual activity: Not on file     Other Topics Concern   • Not on file     Social History Narrative       ALLERGIES  Review of patient's allergies indicates no known allergies.    REVIEW OF SYSTEMS  Review of Systems   Constitutional: Negative for chills and fever.   Respiratory: Negative for cough and shortness of breath.    Cardiovascular: Negative for chest pain and palpitations.   Gastrointestinal: Negative for abdominal pain and vomiting.   Genitourinary: Negative for difficulty urinating and dysuria.   Musculoskeletal: Positive for back pain (Lower). Negative for neck pain.   Neurological: Negative for syncope and weakness.   All other systems reviewed and are negative.      PHYSICAL EXAM  ED Triage Vitals   Temp Heart Rate Resp BP SpO2   01/21/18 0416 01/21/18 0416 01/21/18 0416 -- 01/21/18 0416   97 °F (36.1 °C) 98 18  97 %      Temp src Heart Rate Source Patient Position BP Location FiO2 (%)   01/21/18 0416 01/21/18 0416 -- -- --   Tympanic Monitor          Physical Exam   Constitutional: No distress.   HENT:   Head: Normocephalic and atraumatic.   Eyes: EOM are normal.   Neck: Normal range of motion.   Cardiovascular: Normal rate, normal heart sounds and intact distal pulses.    Pulmonary/Chest: Effort normal and breath sounds normal. No respiratory distress.    Abdominal: There is no tenderness.   Musculoskeletal: He exhibits tenderness (Lower lumbar and right SI joint). He exhibits no edema.   The pt has a positive straight leg raise at 30 degrees on the right.   Neurological: He is alert.   Reflex Scores:       Patellar reflexes are 2+ on the right side and 2+ on the left side.  The pt has good dorsi and plantar flexion of the right foot. The pt has questionable weakness to the right leg that is likely caused by his pain.   Skin: Skin is warm and dry.   Nursing note and vitals reviewed.      LAB RESULTS  Lab Results (last 24 hours)     ** No results found for the last 24 hours. **          I ordered the above labs and reviewed the results    RADIOLOGY  XR Spine Lumbar 4+ View   Final Result       No fracture is identified. Chronic and degenerative changes. If there is   further clinical concern, follow-up MRI could be considered.       This report was finalized on 1/21/2018 6:06 AM by Dr. Edwar Crouch MD.               I ordered the above noted radiological studies. Interpreted by radiologist. Reviewed by me in PACS.       PROCEDURES  Procedures      PROGRESS AND CONSULTS  ED Course     0456  Dilaudid and Zofran ordered.    0459  Dexamethasone ordered.    0500   XR Lumbar Spine ordered for further evaluation.    0606  BP- 140/79 HR- 98 Temp- 97 °F (36.1 °C) (Tympanic) O2 sat- 97%    Rechecked pt, his pain has improved after receiving the Dilaudid. The pt's pain improved to a 1/10. Upon reexamination, the pt's right leg has no weakness. Discussed the negative XR results with the pt. Discussed the plan to discharge the pt with pain medication and steroids, so he is able to get the epidural shot. The pt and family understand and agree with the plan.    0614  Reviewed pt's history and workup with Dr. Amin.  After a bedside evaluation; Dr. Amin agrees with the plan of care.      MEDICAL DECISION MAKING  Results were reviewed/discussed with the patient and they  were also made aware of online access. Pt also made aware that some labs, such as cultures, will not be resulted during ER visit and follow up with PMD is necessary.     MDM  Number of Diagnoses or Management Options     Amount and/or Complexity of Data Reviewed  Tests in the radiology section of CPT®: ordered and reviewed (XR L-Spine - No fracture is identified. Chronic and degenerative changes. If there is  further clinical concern, follow-up MRI could be considered.  )    Patient Progress  Patient progress: stable         DIAGNOSIS  Final diagnoses:   Low back pain with right-sided sciatica, unspecified back pain laterality, unspecified chronicity       DISPOSITION  DISCHARGE    Patient discharged in stable condition.    Reviewed implications of results, diagnosis, meds, responsibility to follow up, warning signs and symptoms of possible worsening, potential complications and reasons to return to ER.    Patient/Family voiced understanding of above instructions.    Discussed plan for discharge, as there is no emergent indication for admission.  Pt/family is agreeable and understands need for follow up and repeat testing.  Pt is aware that discharge does not mean that nothing is wrong but it indicates no emergency is present that requires admission and they must continue care with follow-up as given below or physician of their choice.     FOLLOW-UP  Your Pain Management Doctor    Schedule an appointment as soon as possible for a visit in 5 days  For further evaluation and treatment if not well         Medication List      New Prescriptions          oxyCODONE-acetaminophen 7.5-325 MG per tablet   Commonly known as:  PERCOCET   Take 1 tablet by mouth Every 6 (Six) Hours As Needed for Severe Pain .       predniSONE 20 MG tablet   Commonly known as:  DELTASONE   Take 1 tablet by mouth Daily.               Latest Documented Vital Signs:  As of 6:50 AM  BP- 125/76 HR- 68 Temp- 97.9 °F (36.6 °C) (Oral) O2 sat-  96%    --  Documentation assistance provided by fernando Jolly for [place] [facility].  Information recorded by the scribe was done at my direction and has been verified and validated by me.       Zia Jolly  01/21/18 0636       ABDOUL Gordon III  01/21/18 0650

## 2018-01-21 NOTE — ED NOTES
"Pt was in w/c in waiting room, then stood up and started walking out, \"i have had enough\". This RN tried explaining to the pt and family that a room was being cleaned for him. Pt continued to walk out. This Rn got a w/c and followed pt out trying to convince him to sit so he could be take to a room. Pt shoved the chair away and refused. Pt family tried to convince him as well. Pt continued walking away. CN was made aware     Jhony Saul RN  01/21/18 0353    "

## 2018-01-23 ENCOUNTER — OFFICE VISIT (OUTPATIENT)
Dept: INTERNAL MEDICINE | Facility: CLINIC | Age: 63
End: 2018-01-23

## 2018-01-23 ENCOUNTER — TELEPHONE (OUTPATIENT)
Dept: SOCIAL WORK | Facility: HOSPITAL | Age: 63
End: 2018-01-23

## 2018-01-23 VITALS
HEIGHT: 69 IN | SYSTOLIC BLOOD PRESSURE: 118 MMHG | DIASTOLIC BLOOD PRESSURE: 70 MMHG | WEIGHT: 171 LBS | BODY MASS INDEX: 25.33 KG/M2

## 2018-01-23 DIAGNOSIS — M51.26 HERNIATED NUCLEUS PULPOSUS, L3-4 RIGHT: ICD-10-CM

## 2018-01-23 DIAGNOSIS — M54.16 LUMBAR RADICULOPATHY, ACUTE: Primary | ICD-10-CM

## 2018-01-23 PROCEDURE — 99213 OFFICE O/P EST LOW 20 MIN: CPT | Performed by: INTERNAL MEDICINE

## 2018-01-23 RX ORDER — OXYCODONE AND ACETAMINOPHEN 7.5; 325 MG/1; MG/1
1 TABLET ORAL EVERY 8 HOURS PRN
Qty: 30 TABLET | Refills: 0 | Status: SHIPPED | OUTPATIENT
Start: 2018-01-23 | End: 2018-02-05 | Stop reason: SDUPTHER

## 2018-01-23 RX ORDER — CYCLOBENZAPRINE HCL 10 MG
10 TABLET ORAL 3 TIMES DAILY PRN
Qty: 90 TABLET | Refills: 0 | Status: SHIPPED | OUTPATIENT
Start: 2018-01-23 | End: 2018-03-06

## 2018-01-23 NOTE — PATIENT INSTRUCTIONS
Acute lumbar radiculopathy due to L spine DDD and bulging disk at L3-4 - will continue Oxycodone for now, add muscle relaxant and wait for another epidural. I had advised patient not to take prednisone before 4-5 pm, not later in order to avoid insomnia. Advised not to drive while on muscle relaxant and opiate.

## 2018-01-23 NOTE — PROGRESS NOTES
"Subjective   Quan Holloway is a 62 y.o. male. Patient is here for persistent right sided lower back pain.    History of Present Illness   Patient had been suffering with right sided lower back pain since early December. Patient and MRI that had shown some DDD and bulging disk at L3-4. Had failed PT, states that the pain is getting worse. He had every short temporary improvement from  PT. Had janet to see Dr.KIrk Reed, and epidurals were recommended. He has epidural on 01/12/2018 and improvement lasted 3 days. Patient states that now the pain is at 10/10 and he is states at his visit that he has \"so sense to live in such pain\". Had been to ED on 01/21/2018: given script for Oxycodone, that he had been taking 3 times a day. Also had been on 20 mg of prednisone since 01/21/2018. Another epidural had janet scheduled for 01/26/2018. Patient states that he is not able to find comfortable position, it hurts if he lays down, sits or tries to walk. He had been pacing the room during the visit. Patient denies constipation. Pain is well controlled for 2-3 hours, and then gets worse. Pain wakes him up several times a night, even after taking Oxycodone.  Daughter accompanies patient at his vsiit. She states that he had not been able to sleep at all prior to stating Oxycodone.    The following portions of the patient's history were reviewed and updated as appropriate: allergies, current medications, past family history, past medical history, past social history, past surgical history and problem list.    Review of Systems   Constitutional: Negative for chills and fever.   Eyes: Negative for pain and redness.   Respiratory: Negative for cough and shortness of breath.    Cardiovascular: Negative for chest pain and leg swelling.   Neurological: Negative for dizziness and headaches.       Objective   Physical Exam   Constitutional: He is oriented to person, place, and time. He appears well-developed. He appears listless. He has a " sickly appearance. He appears distressed.   HENT:   Head: Normocephalic and atraumatic.   Right Ear: Tympanic membrane, external ear and ear canal normal.   Left Ear: Tympanic membrane, external ear and ear canal normal.   Nose: Nose normal. Right sinus exhibits no maxillary sinus tenderness and no frontal sinus tenderness. Left sinus exhibits no maxillary sinus tenderness and no frontal sinus tenderness.   Mouth/Throat: Uvula is midline, oropharynx is clear and moist and mucous membranes are normal.   Eyes: Conjunctivae and EOM are normal. Pupils are equal, round, and reactive to light. Right eye exhibits no discharge. Left eye exhibits no discharge. No scleral icterus.   Neck: Neck supple. No JVD present.   Pulmonary/Chest: Effort normal. No respiratory distress.   Musculoskeletal: He exhibits tenderness (right off L3-4). He exhibits no edema.   Lymphadenopathy:     He has no cervical adenopathy.   Neurological: He is oriented to person, place, and time. He appears listless. No cranial nerve deficit.   Skin: Skin is warm and dry. No rash noted.   Psychiatric: He has a normal mood and affect. His behavior is normal.   Vitals reviewed.      Assessment/Plan   Diagnoses and all orders for this visit:    Lumbar radiculopathy, acute    Herniated nucleus pulposus, L3-4 right      Acute lumbar radiculopathy due to L spine DDD and bulging disk at L3-4 - will continue Oxycodone for now, add muscle relaxant and wait for another epidural. I had advised patient not to take prednisone before 4-5 pm, not later in order to avoid insomnia. Advised not to drive while on muscle relaxant and opiate. Potential side effects and potential for habit formation discussed with patient. Will check Zoltan.

## 2018-01-23 NOTE — TELEPHONE ENCOUNTER
Attempted f/u call today and pt's daughter answered the phone and states that she has him at Dr. Tinsley's office at this time. Daughter also states he still has some pain and is taking his pain medication as needed. He was also able to fill the script and is taking it as directed. Daughter voiced no other questions at this time. Ana M BILL

## 2018-01-26 ENCOUNTER — ANESTHESIA EVENT (OUTPATIENT)
Dept: PAIN MEDICINE | Facility: HOSPITAL | Age: 63
End: 2018-01-26

## 2018-01-26 ENCOUNTER — HOSPITAL ENCOUNTER (OUTPATIENT)
Dept: PAIN MEDICINE | Facility: HOSPITAL | Age: 63
Discharge: HOME | End: 2018-01-26
Admitting: INTERNAL MEDICINE

## 2018-01-26 ENCOUNTER — HOSPITAL ENCOUNTER (OUTPATIENT)
Dept: GENERAL RADIOLOGY | Facility: HOSPITAL | Age: 63
Discharge: HOME | End: 2018-01-26

## 2018-01-26 ENCOUNTER — TRANSCRIBE ORDERS (OUTPATIENT)
Dept: PAIN MEDICINE | Facility: HOSPITAL | Age: 63
End: 2018-01-26

## 2018-01-26 ENCOUNTER — ANESTHESIA (OUTPATIENT)
Dept: PAIN MEDICINE | Facility: HOSPITAL | Age: 63
End: 2018-01-26

## 2018-01-26 VITALS
HEART RATE: 71 BPM | RESPIRATION RATE: 16 BRPM | DIASTOLIC BLOOD PRESSURE: 79 MMHG | OXYGEN SATURATION: 94 % | SYSTOLIC BLOOD PRESSURE: 116 MMHG | TEMPERATURE: 98 F | WEIGHT: 170 LBS | HEIGHT: 68 IN | BODY MASS INDEX: 25.76 KG/M2

## 2018-01-26 DIAGNOSIS — R52 PAIN: ICD-10-CM

## 2018-01-26 DIAGNOSIS — M54.16 LUMBAR RADICULOPATHY, ACUTE: ICD-10-CM

## 2018-01-26 DIAGNOSIS — M54.16 LUMBAR RADICULOPATHY, ACUTE: Primary | ICD-10-CM

## 2018-01-26 PROCEDURE — 77003 FLUOROGUIDE FOR SPINE INJECT: CPT

## 2018-01-26 PROCEDURE — 90791 PSYCH DIAGNOSTIC EVALUATION: CPT | Performed by: SOCIAL WORKER

## 2018-01-26 PROCEDURE — 25010000002 MIDAZOLAM PER 1 MG: Performed by: ANESTHESIOLOGY

## 2018-01-26 PROCEDURE — 25010000002 FENTANYL CITRATE (PF) 100 MCG/2ML SOLUTION: Performed by: ANESTHESIOLOGY

## 2018-01-26 PROCEDURE — C1755 CATHETER, INTRASPINAL: HCPCS

## 2018-01-26 RX ORDER — MIDAZOLAM HYDROCHLORIDE 1 MG/ML
1 INJECTION INTRAMUSCULAR; INTRAVENOUS AS NEEDED
Status: DISCONTINUED | OUTPATIENT
Start: 2018-01-26 | End: 2018-01-27 | Stop reason: HOSPADM

## 2018-01-26 RX ORDER — FENTANYL CITRATE 50 UG/ML
50 INJECTION, SOLUTION INTRAMUSCULAR; INTRAVENOUS AS NEEDED
Status: DISCONTINUED | OUTPATIENT
Start: 2018-01-26 | End: 2018-01-27 | Stop reason: HOSPADM

## 2018-01-26 RX ORDER — LIDOCAINE HYDROCHLORIDE 10 MG/ML
1 INJECTION, SOLUTION INFILTRATION; PERINEURAL ONCE AS NEEDED
Status: DISCONTINUED | OUTPATIENT
Start: 2018-01-26 | End: 2018-01-27 | Stop reason: HOSPADM

## 2018-01-26 RX ORDER — LIDOCAINE HYDROCHLORIDE 10 MG/ML
0.5 INJECTION, SOLUTION INFILTRATION; PERINEURAL ONCE AS NEEDED
Status: DISCONTINUED | OUTPATIENT
Start: 2018-01-26 | End: 2018-01-27 | Stop reason: HOSPADM

## 2018-01-26 RX ORDER — METHYLPREDNISOLONE ACETATE 80 MG/ML
80 INJECTION, SUSPENSION INTRA-ARTICULAR; INTRALESIONAL; INTRAMUSCULAR; SOFT TISSUE ONCE
Status: DISCONTINUED | OUTPATIENT
Start: 2018-01-26 | End: 2018-01-27 | Stop reason: HOSPADM

## 2018-01-26 RX ORDER — SODIUM CHLORIDE 0.9 % (FLUSH) 0.9 %
1-10 SYRINGE (ML) INJECTION AS NEEDED
Status: DISCONTINUED | OUTPATIENT
Start: 2018-01-26 | End: 2018-01-27 | Stop reason: HOSPADM

## 2018-01-26 RX ADMIN — FENTANYL CITRATE 100 MCG: 50 INJECTION, SOLUTION INTRAMUSCULAR; INTRAVENOUS at 08:39

## 2018-01-26 RX ADMIN — MIDAZOLAM 1 MG: 1 INJECTION INTRAMUSCULAR; INTRAVENOUS at 08:39

## 2018-01-26 NOTE — NURSING NOTE
Spoke to Dhara, Dr Reed medical assistant, explained to her what patient said today about pain and suicidal thoughts and  said we need to let Dr. Reed office know.  Dhara looked up patient and saw he had an appointment on Tuesday, January 30th but this is with a nurse practioner, Dhara is going to call the patient today and get him an appointment with Dr. Reed early next week instead of the nurse practioner and she will relay this information to Dr. Reed.

## 2018-01-26 NOTE — CONSULTS
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  Patient has sig back injury that began in Sept 2017.  He reports that the pain is unbearable and that the pain medications do not work.  He was in the ED on Saturday due to unbearable pain.  Patient's daughter was at bedside during the eval.  no. [id number] assisted w/ the assessment by telephone.  Patient acknowledge having thoughts of hurting himself if the pain did not subside.  He would not discuss and plan only to say he is the one to determine what happens to him.  He reports that the pain in unbearable.  RN's in pain management report that they had to get a stretcher to bring patient into the pain management area.  He denies appetite issues.  He reports he has not strength due to the pain.  Patient states that on Sat he tried to attempt suicide. He will not say what.  He is denying active SI now as the pain has been improved.     Patient denies any prior mental health or [facility] tx.  When ask a/b mental health treatment he says what far?  He drank more prior to immigrating to the the [place].  Per patient and daughter he drinks maybe on holiday's now.  He has smoked tobacco for 50 years.  No use of illicit drugs.    Patient lives w/ his wife, daughter and granddaughter.   He work in welding and metals.  He has not been driving due to medications.  He is actively involved w/ his grand children.  He is from the [facility].  He has a HS education. Per daughter there are guns in the home. She was advised to remove them. She has concerns that things will be made worse if family removes the guns.     Patient is alert and O x 4.  He denies any SI or HI.  He reports that the epidural that he had earlier today has improved his pain. He smiles and jokes a/b his grandchildren.  Daughter denies any immediate concern a/b his safety. He is pleased that his appointment w/ his pain mgmt MD has been moved up to Tuesday. Daughter is also  pleased and believes that gives him more hope.  No a/v hallucinations. Speech was appropriate.  He was able to speak some English.  Reviewed information obtained w/ his daughter privately and she concurred it was accurate from listening to the assessment.     Discussed case with Dr. Hankins.  He recommended that be discharged w/ plans to follow up w/ his physicians. Addressed safety plan w/ patient and daughter.  He was also provided a list of outpatient mental health providers should he decide to get help to deal w/ the stress of the pain.  Pt and daughter pleased w/ plan and eval. Reviewed w/ pain mgmt staff.

## 2018-01-26 NOTE — INTERVAL H&P NOTE
Pt reporting pain so severe that he's having suicidal ideation.  dtg reports he can't wait 1 month to be seen by ALANA.  I have called Dr. NAVARRETE/Dr. Pastor's office to attempt to obtain evaluation -- am awaiting call back.  If unable to get evaluated by ALANA will send to ED vs therapy for further evaluation.      Diagnosis     * Lumbar radiculopathy [M54.16]     * Herniated nucleus pulposus, L3-4 right [M51.26]     * Degeneration of lumbar intervertebral disc [M51.36]

## 2018-01-26 NOTE — NURSING NOTE
I called and made a sooner appointment for patient for Dr. Reed office per request of Dr Grossman.  Appointment made for Tuesday, January 30th at 9:30 at [place] office.  Appointment date, time, and address given to patient and patient daughter.

## 2018-01-26 NOTE — SIGNIFICANT NOTE
"Pt and pt's daughter states that he \"wishes to be dead so the pain will go away.\" Pt was in ER this past weekend, was given injections that just lasted temporarily.  Clinical references added  "

## 2018-01-26 NOTE — NURSING NOTE
Pt. still here due to positive suicide screen.  Pt. and staff  waiting on a call back from access center in hospital regarding further intervention.

## 2018-01-26 NOTE — ANESTHESIA PROCEDURE NOTES
PAIN Epidural block    Patient location during procedure: pain clinic  Start Time: 1/26/2018 8:37 AM  Stop Time: 1/26/2018 8:46 AM  Indication:procedure for pain  Performed By  Anesthesiologist: JUAN JOSE MASSEY  Preanesthetic Checklist  Completed: patient identified, site marked, surgical consent, pre-op evaluation, timeout performed, IV checked, risks and benefits discussed and monitors and equipment checked  Additional Notes  Fluoro used  Lumbar radiculopathy, lumbar degenerative disc dz, lumbar herniated disc  Prep:  Pt Position:prone  Sterile Tech:cap, gloves, mask and sterile barrier  Prep:chlorhexidine gluconate and isopropyl alcohol  Monitoring:blood pressure monitoring, continuous pulse oximetry and EKG  Procedure:  Sedation: yes   Approach:midline  Guidance: fluoroscopy  Location:lumbar  Level:3-4  Needle Type:Tuohy  Needle Gauge:20  Aspiration:negative  Medications:  Depomedrol:80  Preservative Free Saline:3mL  Comments:1 ml of 1% lidocaine  Post Assessment:  Dressing:occlusive dressing applied  Pt Tolerance:patient tolerated the procedure well with no apparent complications  Complications:no

## 2018-01-26 NOTE — INTERVAL H&P NOTE
[facility]  H&P reviewed. No changes since last visit.  Patient states   % improvement since the last procedure/injection.  Lasted approx 3 days.  Today Pain level is excruciating, 10/10.  Pain meds help for several hours.  Pt to have repeat visit w/ spine physician Dr. Henry in approx 1 month.      Diagnosis     * Lumbar radiculopathy [M54.16]     * Herniated nucleus pulposus, L3-4 right [M51.26]     * Degeneration of lumbar intervertebral disc [M51.36]      Airway assessed since last visit. Airway class equals: 2.    Plan:  1. Epidural with fluoroscopy +/- epidurogram (if needed)  2. Physical therapy exercises at home as prescribed by physical therapy or from the pain clinic handout (given to the patient). Continuation of these exercises every day, or multiple times per week, even when the patient has good pain relief, was stressed to the patient as a preventative measure to decrease the frequency and severity of future pain episodes.  3. Continue pain medicines as already prescribed. If patient not currently taking any, it is recommended to begin Acetaminophen 1000 mg po q 8 hours. If other medicines containing Acetaminophen are currently prescribed, maintain daily dose at 3000mg.   4. If they can tolerate NSAIDS, it is recommended to take Ibuprofen 600 mg po q 6 hours for 7 days during pain exacerbations. Alternatively, they may substitute an NSAID of their choice (e.g. Aleve)  5. Heat and ice to the affected area as tolerated for pain control. It was discussed that heating pads can cause burns.  6. Low impact exercise such as walking or water exercise was recommended to maintain overall health and aid in weight control.   7. Follow up as needed for subsequent injections.  8. Patient was counseled to abstain from tobacco products  9.  Recommend visit to spine physician earlier than 1 month if possible to evaluate for surgical intervention if epidural does not provide extended relief

## 2018-01-26 NOTE — H&P (VIEW-ONLY)
"[facility]    History and Physical    Patient Name: Quan Holloway  :  1955  MRN:  [id number]  Date of Admission: 2018    Subjective     Patient is a 62 y.o. male presents with chief complaint of acute, constant, severe, 5-10/10, stabbing, ? etiology low back and leg: right pain.  Onset of symptoms was gradual starting 2 months ago.  Symptoms are associated/aggravated by activity. Symptoms improve with relaxation and lying down    The following portions of the patients history were reviewed and updated as appropriate: current medications, allergies, past medical history, past surgical history, past family history, past social history and problem list                Objective     Past Medical History:   Past Medical History:   Diagnosis Date   • Arthritis    • Blindness of left eye     since birth   • Low back pain      Past Surgical History:   Past Surgical History:   Procedure Laterality Date   • APPENDECTOMY     • SHOULDER SURGERY       Family History:   Family History   Problem Relation Age of Onset   • Brain cancer Mother    • Prostate cancer Father    • Other Father      PAD   • Stroke Father      Social History:   Social History   Substance Use Topics   • Smoking status: Current Every Day Smoker     Packs/day: 1.00     Years: 48.00   • Smokeless tobacco: Never Used   • Alcohol use 0.6 oz/week     1 Standard drinks or equivalent per week      Comment: 1 DRINK PER WEEK       Vital Signs Range for the last 24 hours  Temperature: Temp:  [36.6 °C (97.9 °F)] 36.6 °C (97.9 °F)   Temp Source: Temp src: Oral   BP: BP: (141)/(88) 141/88   Pulse: Heart Rate:  [75] 75   Respirations: Resp:  [16] 16   SPO2: SpO2:  [95 %] 95 %   O2 Amount (l/min):     O2 Devices O2 Device: room air   Weight: Weight:  [79.8 kg (176 lb)] 79.8 kg (176 lb)     Flowsheet Rows         First Filed Value    Admission Height  172.7 cm (68\") Documented at 2018 0806    Admission Weight  79.8 kg (176 lb) Documented at " 01/12/2018 0806          --------------------------------------------------------------------------------    Current Outpatient Prescriptions   Medication Sig Dispense Refill   • tamsulosin (FLOMAX) 0.4 MG capsule 24 hr capsule Take 1 capsule by mouth Every Night. 30 capsule 3     Current Facility-Administered Medications   Medication Dose Route Frequency Provider Last Rate Last Dose   • fentaNYL citrate (PF) (SUBLIMAZE) injection 50 mcg  50 mcg Intravenous Q5 Min PRN Quique Calero MD       • iopamidol (ISOVUE-M 200) injection 41%  2 mL Injection Once in imaging Quique Calero MD       • lidocaine (XYLOCAINE) 1 % injection 1 mL  1 mL Intradermal Once Quique Calero MD       • methylPREDNISolone acetate (DEPO-medrol) injection 80 mg  80 mg Intramuscular Once Quique Calero MD       • midazolam (VERSED) injection 1 mg  1 mg Intravenous Q5 Min PRN Quique Calero MD       • sodium chloride 0.9 % flush 1-10 mL  1-10 mL Intravenous PRN Quique Calero MD           --------------------------------------------------------------------------------  Assessment/Plan      Anesthesia Evaluation     Patient summary reviewed and Nursing notes reviewed          Airway   Mallampati: II  Dental - normal exam     Pulmonary - negative pulmonary ROS and normal exam   Cardiovascular - negative cardio ROS and normal exam        Neuro/Psych- negative ROS  (+)   right straight leg raise test,    GI/Hepatic/Renal/Endo - negative ROS     Musculoskeletal (-) negative ROS and normal exam    Abdominal  - normal exam   Substance History - negative use     OB/GYN negative ob/gyn ROS         Other                                           Diagnosis and Plan    Treatment Plan  ASA 2      Procedures: Lumbar Epidural Steroid Injection(LESI), With fluoroscopy,       Anesthetic plan and risks discussed with patient.          Diagnosis     * Lumbar disc displacement without myelopathy [M51.26]     * Lumbar radiculopathy [M54.16]     * Lumbar  "degenerative disc disease [M51.36]            CHIEF COMPLAINT:       HISTORY OF PRESENT ILLNESS:      PAST MEDICAL HISTORY:  Current Outpatient Prescriptions on File Prior to Encounter   Medication Sig Dispense Refill   • tamsulosin (FLOMAX) 0.4 MG capsule 24 hr capsule Take 1 capsule by mouth Every Night. 30 capsule 3   • [DISCONTINUED] diclofenac sodium (VOTAREN XR) 100 MG 24 hr tablet Take 1 tablet by mouth Daily. 30 tablet 0   • [DISCONTINUED] gabapentin (NEURONTIN) 100 MG capsule Take 1 capsule by mouth 3 (Three) Times a Day. 90 capsule 0   • [DISCONTINUED] predniSONE (DELTASONE) 10 MG tablet Take 2 tablets by mouth 2 (Two) Times a Day With Meals. 20 tablet 0     No current facility-administered medications on file prior to encounter.        Past Medical History:   Diagnosis Date   • Arthritis    • Blindness of left eye     since birth   • Low back pain          SOCIAL HISTORY:  No tobacco    REVIEW OF SYSTEMS:  No hematologic infectious or constitutional symptoms  Other review of systems non-contributory    PHYSICAL EXAM:  /88 (BP Location: Left arm, Patient Position: Sitting)  Pulse 75  Temp 36.6 °C (97.9 °F) (Oral)   Resp 16  Ht 172.7 cm (68\")  Wt 79.8 kg (176 lb)  SpO2 95%  BMI 26.76 kg/m2  Well-developed well-nourished no acute distress  Extra ocular movements intact  Mallampati class 2 airway  Cardiac:  Regular rate and rhythm  Lungs:  Clear to auscultation bilaterally with good effort  Alert and oriented ×3  Deep tendon reflexes normal in the bilateral patella  Positive straight leg raise bilaterally  5 out of 5 strength bilateral upper and lower extremities  Lumbar spine without obvious deformities ecchymoses  Lumbar spine nontender to palpation      DIAGNOSIS:  Post-Op Diagnosis Codes:     * Lumbar disc displacement without myelopathy [M51.26]     * Lumbar radiculopathy [M54.16]     * Lumbar degenerative disc disease [M51.36]    PLAN:  1.  Lumbar epidural steroid injections, up to 3, " spaced 1-2 weeks apart.  If pain control is acceptable after 1 or 2 injections, it was discussed with the patient that they may return for the subsequent injections if and when their pain returns.  The risks were discussed with the patient including failure of relief, worsening pain, Headache (post dural puncture headache), bleeding (epidural hematoma) and infection (epidural abscess or skin infection).  2.  Physical therapy exercises at home as prescribed by physical therapy or from the pain clinic handout (given to the patient).  Continuation of these exercises every day, or multiple times per week, even when the patient has good pain relief, was stressed to the patient as a preventative measure to decrease the frequency and severity of future pain episodes.  3.  Continue pain medicines as already prescribed.  If patient not currently taking any, it is recommended to begin Acetaminophen 1000 mg po q 8 hours.  If other medicines containing Acetaminophen are currently prescribed, maintain daily dose at 3000 mg.    4.  If they can tolerate NSAIDS, it is recommended to take Ibuprofen 600 mg po q 6 hours for 7 days during pain exacerbations.  Alternatively, they may substitute an NSAID of their choice (e.g. Aleve).  This may be taken at the same time as Acetaminophen.  5.  Heat and ice to the affected area as tolerated for pain control.  It was discussed that heating pads can cause burns.  6.  Daily low impact exercise such as walking or water exercise was recommended to maintain overall health and aid in weight control.   7.  Follow up as needed for subsequent injections.  8.  Patient was counseled to abstain from tobacco products.

## 2018-01-26 NOTE — NURSING NOTE
"When admitting patient to pain management for lumber epidural, suicidal questions were asked and pt stated he had been having suicidal thoughts due to pain level.  Pt had daughter at bedside to interpret language, she stated he had said \"pain was so bad he wanted to die\". Pt was asked if would like to speak to someone about this or go to ER and he said yes.  Access center was contacted and  came down to Pain Management to speak to patient. Please see  note for further information.  "

## 2018-01-26 NOTE — INTERVAL H&P NOTE
Psychiatric  Pain much improved s/p epidural placement this am.  Pt previously been seen by dr warner & dr. Henry.  Have gotten appt for pt for this Tuesday.  Currently being evaluated by mental health services.

## 2018-01-29 ENCOUNTER — TELEPHONE (OUTPATIENT)
Dept: INTERNAL MEDICINE | Facility: CLINIC | Age: 63
End: 2018-01-29

## 2018-01-29 DIAGNOSIS — I73.9 PAD (PERIPHERAL ARTERY DISEASE) (HCC): Primary | ICD-10-CM

## 2018-01-29 NOTE — TELEPHONE ENCOUNTER
Talked to the patient. Feels better after epidural #2 done on 01/27/2018. Still in great discomfort and interested in surgical treatment. Has appointment with spinal ortho on 01/31/2018. Patient also c/o tightness in the right calf. Feels better laying and avoid walking, as it provokes the pain.   Patient is a smoker and has known PAD - will refer to repeat Doppler GEOVANNA to see any worsening of PAD and if this possibly had been contributing to his pain.

## 2018-02-01 ENCOUNTER — HOSPITAL ENCOUNTER (OUTPATIENT)
Dept: CARDIOLOGY | Facility: HOSPITAL | Age: 63
Discharge: HOME | End: 2018-02-01
Admitting: INTERNAL MEDICINE

## 2018-02-01 DIAGNOSIS — I73.9 PAD (PERIPHERAL ARTERY DISEASE) (HCC): ICD-10-CM

## 2018-02-01 LAB
BH CV LOWER ARTERIAL LEFT ABI RATIO: 1.11
BH CV LOWER ARTERIAL LEFT DORSALIS PEDIS SYS MAX: 128 MMHG
BH CV LOWER ARTERIAL LEFT GREAT TOE SYS MAX: 81 MMHG
BH CV LOWER ARTERIAL LEFT POST TIBIAL SYS MAX: 136 MMHG
BH CV LOWER ARTERIAL LEFT TBI RATIO: 0.66
BH CV LOWER ARTERIAL RIGHT ABI RATIO: 1.18
BH CV LOWER ARTERIAL RIGHT DORSALIS PEDIS SYS MAX: 117 MMHG
BH CV LOWER ARTERIAL RIGHT GREAT TOE SYS MAX: 69 MMHG
BH CV LOWER ARTERIAL RIGHT POST TIBIAL SYS MAX: 144 MMHG
BH CV LOWER ARTERIAL RIGHT TBI RATIO: 0.57
UPPER ARTERIAL LEFT ARM BRACHIAL SYS MAX: 122 MMHG
UPPER ARTERIAL RIGHT ARM BRACHIAL SYS MAX: 115 MMHG

## 2018-02-01 PROCEDURE — 93922 UPR/L XTREMITY ART 2 LEVELS: CPT

## 2018-02-01 NOTE — PROGRESS NOTES
Please call patient: please call patient's daughter: no change in the vascular study sionce 2 years ago. He has mild artery disease in the right leg(toes mainly) due to smoking, but there had not been a progression since the last test. How is his pain?

## 2018-02-05 ENCOUNTER — OFFICE VISIT (OUTPATIENT)
Dept: INTERNAL MEDICINE | Facility: CLINIC | Age: 63
End: 2018-02-05

## 2018-02-05 VITALS
RESPIRATION RATE: 14 BRPM | HEIGHT: 69 IN | WEIGHT: 171 LBS | SYSTOLIC BLOOD PRESSURE: 110 MMHG | BODY MASS INDEX: 25.33 KG/M2 | DIASTOLIC BLOOD PRESSURE: 62 MMHG

## 2018-02-05 DIAGNOSIS — M54.16 LUMBAR RADICULOPATHY, ACUTE: ICD-10-CM

## 2018-02-05 DIAGNOSIS — M51.26 HERNIATED NUCLEUS PULPOSUS, L3-4 RIGHT: ICD-10-CM

## 2018-02-05 PROCEDURE — 99213 OFFICE O/P EST LOW 20 MIN: CPT | Performed by: INTERNAL MEDICINE

## 2018-02-05 RX ORDER — OXYCODONE AND ACETAMINOPHEN 7.5; 325 MG/1; MG/1
1 TABLET ORAL EVERY 8 HOURS PRN
Qty: 30 TABLET | Refills: 0 | Status: SHIPPED | OUTPATIENT
Start: 2018-02-05 | End: 2018-03-06

## 2018-02-05 NOTE — PROGRESS NOTES
Subjective   Quan Holloway is a 62 y.o. male.     History of Present Illness   Quan Holloway 62 y.o. male presents today for lumbar radiculopathy f/u. Last was seen on 01/26/2018 and on that visit he had complained of severe pain in the right side of the lower back, radiating to the right leg. We had continue Hydrocodone and muscle relaxant. We had high hopes for the 2nd epidural. Also patient had been  referred for Doppler ABIs due to his h/o PAD and continued smoking.  Patient is compliant with treatment and denies  side effects. Patient reports no change in symptoms with medication change. Had failed 2nd epidural: had rel;ief for about 4 days and now his pain is back. Patient c/o burning on the lateral side of the leg and pain, that wakes him up at night. He is not able to stand or sit and bear weight on his right leg at all: even for meals he has to lay down. The surgery is scheduled for 02/09/2018.    The following portions of the patient's history were reviewed and updated as appropriate: allergies, current medications, past family history, past medical history, past social history, past surgical history and problem list.    Review of Systems   Constitutional: Negative for chills and fever.   Eyes: Negative for pain and redness.   Respiratory: Negative for cough and shortness of breath.    Cardiovascular: Negative for chest pain and leg swelling.   Musculoskeletal: Positive for back pain.   Neurological: Negative for dizziness and headaches.       Objective   Physical Exam   Constitutional: He is oriented to person, place, and time. He appears well-developed and well-nourished.   Very uncomfortable. Stays supine all visit   HENT:   Head: Normocephalic and atraumatic.   Right Ear: Tympanic membrane, external ear and ear canal normal.   Left Ear: Tympanic membrane, external ear and ear canal normal.   Nose: Nose normal. Right sinus exhibits no maxillary sinus tenderness and no frontal sinus tenderness. Left  sinus exhibits no maxillary sinus tenderness and no frontal sinus tenderness.   Mouth/Throat: Uvula is midline, oropharynx is clear and moist and mucous membranes are normal.   Eyes: Conjunctivae and EOM are normal. Pupils are equal, round, and reactive to light. Right eye exhibits no discharge. Left eye exhibits no discharge. No scleral icterus.   Neck: Neck supple. No JVD present.   Cardiovascular: Normal rate, regular rhythm and normal heart sounds.  Exam reveals no gallop and no friction rub.    No murmur heard.  Pulmonary/Chest: Effort normal and breath sounds normal. He has no wheezes. He has no rales.   Musculoskeletal: He exhibits tenderness (right off L3-5). He exhibits no edema.   Lymphadenopathy:     He has no cervical adenopathy.   Neurological: He is alert and oriented to person, place, and time. No cranial nerve deficit.   Skin: Skin is warm and dry. No rash noted.   Psychiatric: He has a normal mood and affect. His behavior is normal.   Vitals reviewed.      Assessment/Plan   Quan was seen today for back pain.    Diagnoses and all orders for this visit:    Lumbar radiculopathy, acute  -     oxyCODONE-acetaminophen (PERCOCET) 7.5-325 MG per tablet; Take 1 tablet by mouth Every 8 (Eight) Hours As Needed for Severe Pain .    Herniated nucleus pulposus, L3-4 right  -     oxyCODONE-acetaminophen (PERCOCET) 7.5-325 MG per tablet; Take 1 tablet by mouth Every 8 (Eight) Hours As Needed for Severe Pain .    Acute radiculopathy due to L spine DDD with herniated disk - had failed 2 epidurals. Scheduled for surgery. Will continue Percocet for now before the surgery. I had explained to the patient that as he has a lot of arthritic changes in the L spine, the problem is chronic and at times may flare: in those cases we will use PT, heat, over the counter Salonpas patches and NSAIDs if needed.

## 2018-02-05 NOTE — PATIENT INSTRUCTIONS
Acute radiculopathy due to L spine DDD with herniated disk - had failed 2 epidurals. Scheduled for surgery. Will continue Percocet for now before the surgery. I had explained to the patient that as he has a lot of arthritic changes in the L spine, the problem is chronic and at times may flare: in those cases we will use PT, heat, over the counter Salonpas patches and NSAIDs if needed.

## 2018-02-07 ENCOUNTER — APPOINTMENT (OUTPATIENT)
Dept: PAIN MEDICINE | Facility: HOSPITAL | Age: 63
End: 2018-02-07

## 2018-02-16 ENCOUNTER — APPOINTMENT (OUTPATIENT)
Dept: PAIN MEDICINE | Facility: HOSPITAL | Age: 63
End: 2018-02-16

## 2018-03-06 ENCOUNTER — OFFICE VISIT (OUTPATIENT)
Dept: INTERNAL MEDICINE | Facility: CLINIC | Age: 63
End: 2018-03-06

## 2018-03-06 VITALS
SYSTOLIC BLOOD PRESSURE: 122 MMHG | DIASTOLIC BLOOD PRESSURE: 62 MMHG | WEIGHT: 172 LBS | BODY MASS INDEX: 25.48 KG/M2 | HEIGHT: 69 IN | RESPIRATION RATE: 14 BRPM

## 2018-03-06 DIAGNOSIS — M51.26 HERNIATED NUCLEUS PULPOSUS, L3-4 RIGHT: ICD-10-CM

## 2018-03-06 DIAGNOSIS — R39.16 BENIGN PROSTATIC HYPERPLASIA (BPH) WITH STRAINING ON URINATION: ICD-10-CM

## 2018-03-06 DIAGNOSIS — M54.16 LUMBAR RADICULOPATHY, ACUTE: Primary | ICD-10-CM

## 2018-03-06 DIAGNOSIS — N40.1 BENIGN PROSTATIC HYPERPLASIA (BPH) WITH STRAINING ON URINATION: ICD-10-CM

## 2018-03-06 DIAGNOSIS — F17.200 TOBACCO USE DISORDER: ICD-10-CM

## 2018-03-06 PROBLEM — Z98.890 S/P LUMBAR DISCECTOMY: Status: ACTIVE | Noted: 2018-02-28

## 2018-03-06 PROCEDURE — 99213 OFFICE O/P EST LOW 20 MIN: CPT | Performed by: INTERNAL MEDICINE

## 2018-03-06 PROCEDURE — 99407 BEHAV CHNG SMOKING > 10 MIN: CPT | Performed by: INTERNAL MEDICINE

## 2018-03-06 RX ORDER — TAMSULOSIN HYDROCHLORIDE 0.4 MG/1
1 CAPSULE ORAL NIGHTLY
Qty: 30 CAPSULE | Refills: 11 | Status: SHIPPED | OUTPATIENT
Start: 2018-03-06 | End: 2019-03-10 | Stop reason: SDUPTHER

## 2018-03-06 NOTE — PROGRESS NOTES
Subjective   Quan Holloway is a 62 y.o. male.     History of Present Illness     Patient is here for lumbar radiculopathy f/u. He had failed conservative treatment, including epidural injections, and had L3-4 diskectomy done by  on 02/12/2018. Patient had been doing well postoperatively. Still in pain after prolonged standing, immobility or with turns of his torso. Pain if he needs to stretch reaching for the objects. No pain in the right leg, but still with occasional numbness laterally over the right lower leg. No use of pain medications or NSAIDs.Recommendations from  per patient: no twisting movements, encourage to walk and avoid prolonged standing/sitting, no lifting.    The following portions of the patient's history were reviewed and updated as appropriate: allergies, current medications, past family history, past medical history, past social history, past surgical history and problem list.    Review of Systems   Constitutional: Negative for chills and fever.   Eyes: Negative for pain and redness.   Respiratory: Negative for cough and shortness of breath.    Cardiovascular: Negative for chest pain and leg swelling.   Neurological: Negative for dizziness and headaches.       Objective   Physical Exam   Constitutional: He is oriented to person, place, and time. He appears well-developed and well-nourished.   HENT:   Head: Normocephalic and atraumatic.   Right Ear: Tympanic membrane, external ear and ear canal normal.   Left Ear: Tympanic membrane, external ear and ear canal normal.   Nose: Nose normal. Right sinus exhibits no maxillary sinus tenderness and no frontal sinus tenderness. Left sinus exhibits no maxillary sinus tenderness and no frontal sinus tenderness.   Mouth/Throat: Uvula is midline, oropharynx is clear and moist and mucous membranes are normal.   Eyes: Conjunctivae and EOM are normal. Pupils are equal, round, and reactive to light. Right eye exhibits no discharge. Left eye  exhibits no discharge. No scleral icterus.   Neck: Neck supple. No JVD present.   Cardiovascular: Normal rate, regular rhythm and normal heart sounds.  Exam reveals no gallop and no friction rub.    No murmur heard.  Pulmonary/Chest: Effort normal and breath sounds normal. He has no wheezes. He has no rales.   Musculoskeletal: He exhibits no edema.   Lymphadenopathy:     He has no cervical adenopathy.   Neurological: He is alert and oriented to person, place, and time. No cranial nerve deficit.   Skin: Skin is warm and dry. No rash noted.   Fresh well healing scar over the mid lower back   Psychiatric: He has a normal mood and affect. His behavior is normal.   Vitals reviewed.      Assessment/Plan   Quan was seen today for lumbar radiculopathy.    Diagnoses and all orders for this visit:    Lumbar radiculopathy, acute    Herniated nucleus pulposus, L3-4 right    Tobacco use disorder      1. Lumbar radiculopathy due to herniated nucleus pulposum - excellent response to the laminectomy. Off meds. Slow increase in mobility and exertion. Under the care of , had next visit on 03/28/2018.   2. Quan Holloway is a tobacco smoker and currently smokes one pack  a day. Patient has h/o 48 year-packs of tobacco smoking. Tobacco related comorbidities: none. Aggravating factors include patients unwillingness to listen to his family and me.. Previous attempts to quit: was able to quit for 3 and for 5 years in a past, but had restarted..  Cardiovascular risks and cancers linked to tobacco smoking discussed with patient.  I discussed with the patient several smoking cessation  Strategies, like nicotine patches/gum/lozenges.  We also discussed the use of Bupropion HCl and Chantix, including benefits and limitations of each of those medications.  Patient is not ready to quit at that time..

## 2018-04-16 ENCOUNTER — TELEPHONE (OUTPATIENT)
Dept: INTERNAL MEDICINE | Facility: CLINIC | Age: 63
End: 2018-04-16

## 2018-04-16 DIAGNOSIS — Z00.00 HEALTH CARE MAINTENANCE: Primary | ICD-10-CM

## 2018-04-16 NOTE — TELEPHONE ENCOUNTER
----- Message from Natalya Johnson sent at 4/16/2018 12:17 PM EDT -----  Patient scheduled for labs tomorrow, 04/17/2018.  Need orders please.  Thanks.

## 2018-04-17 ENCOUNTER — LAB (OUTPATIENT)
Dept: INTERNAL MEDICINE | Facility: CLINIC | Age: 63
End: 2018-04-17

## 2018-04-17 DIAGNOSIS — Z00.00 HEALTH CARE MAINTENANCE: ICD-10-CM

## 2018-04-17 DIAGNOSIS — R73.01 IMPAIRED FASTING BLOOD SUGAR: Primary | ICD-10-CM

## 2018-04-17 LAB
ALBUMIN SERPL-MCNC: 4.4 G/DL (ref 3.5–5.2)
ALBUMIN/GLOB SERPL: 1.4 G/DL
ALP SERPL-CCNC: 90 U/L (ref 39–117)
ALT SERPL W P-5'-P-CCNC: 23 U/L (ref 1–41)
ANION GAP SERPL CALCULATED.3IONS-SCNC: 12.5 MMOL/L
AST SERPL-CCNC: 13 U/L (ref 1–40)
BASOPHILS # BLD AUTO: 0.03 10*3/MM3 (ref 0–0.2)
BASOPHILS NFR BLD AUTO: 0.5 % (ref 0–2)
BILIRUB SERPL-MCNC: 0.4 MG/DL (ref 0.1–1.2)
BILIRUB UR QL STRIP: NEGATIVE
BUN BLD-MCNC: 15 MG/DL (ref 8–23)
BUN/CREAT SERPL: 16 (ref 7–25)
CALCIUM SPEC-SCNC: 9.3 MG/DL (ref 8.6–10.5)
CHLORIDE SERPL-SCNC: 103 MMOL/L (ref 98–107)
CHOLEST SERPL-MCNC: 274 MG/DL (ref 0–200)
CLARITY UR: CLEAR
CO2 SERPL-SCNC: 27.5 MMOL/L (ref 22–29)
COLOR UR: YELLOW
CREAT BLD-MCNC: 0.94 MG/DL (ref 0.76–1.27)
DEPRECATED RDW RBC AUTO: 41.6 FL (ref 37–54)
EOSINOPHIL # BLD AUTO: 0.31 10*3/MM3 (ref 0–0.7)
EOSINOPHIL NFR BLD AUTO: 4.7 % (ref 0–5)
ERYTHROCYTE [DISTWIDTH] IN BLOOD BY AUTOMATED COUNT: 12.4 % (ref 11.5–15)
GFR SERPL CREATININE-BSD FRML MDRD: 82 ML/MIN/1.73
GLOBULIN UR ELPH-MCNC: 3.2 GM/DL
GLUCOSE BLD-MCNC: 127 MG/DL (ref 65–99)
GLUCOSE UR STRIP-MCNC: NEGATIVE MG/DL
HBA1C MFR BLD: 6.16 % (ref 4.8–5.6)
HCT VFR BLD AUTO: 47.4 % (ref 40.1–51)
HCV AB SER DONR QL: NORMAL
HDLC SERPL-MCNC: 58 MG/DL (ref 40–60)
HGB BLD-MCNC: 16.5 G/DL (ref 13.7–17.5)
HGB UR QL STRIP.AUTO: NEGATIVE
KETONES UR QL STRIP: NEGATIVE
LDLC SERPL CALC-MCNC: 196 MG/DL (ref 0–100)
LDLC/HDLC SERPL: 3.39 {RATIO}
LEUKOCYTE ESTERASE UR QL STRIP.AUTO: NEGATIVE
LYMPHOCYTES # BLD AUTO: 2.3 10*3/MM3 (ref 0.8–7)
LYMPHOCYTES NFR BLD AUTO: 34.6 % (ref 10–60)
MCH RBC QN AUTO: 32.4 PG (ref 26–34)
MCHC RBC AUTO-ENTMCNC: 34.8 G/DL (ref 31–37)
MCV RBC AUTO: 93.1 FL (ref 80–100)
MONOCYTES # BLD AUTO: 0.46 10*3/MM3 (ref 0–1)
MONOCYTES NFR BLD AUTO: 6.9 % (ref 0–13)
NEUTROPHILS # BLD AUTO: 3.55 10*3/MM3 (ref 1–11)
NEUTROPHILS NFR BLD AUTO: 53.3 % (ref 30–85)
NITRITE UR QL STRIP: NEGATIVE
PH UR STRIP.AUTO: <=5 [PH] (ref 5–8)
PLATELET # BLD AUTO: 212 10*3/MM3 (ref 150–450)
PMV BLD AUTO: 11.1 FL (ref 6–12)
POTASSIUM BLD-SCNC: 4.5 MMOL/L (ref 3.5–5.2)
PROT SERPL-MCNC: 7.6 G/DL (ref 6–8.5)
PROT UR QL STRIP: NEGATIVE
PSA SERPL-MCNC: 2.91 NG/ML (ref 0–4)
RBC # BLD AUTO: 5.09 10*6/MM3 (ref 4.63–6.08)
SODIUM BLD-SCNC: 143 MMOL/L (ref 136–145)
SP GR UR STRIP: >=1.03 (ref 1–1.03)
TRIGL SERPL-MCNC: 98 MG/DL (ref 0–150)
TSH SERPL DL<=0.05 MIU/L-ACNC: 2.94 MIU/ML (ref 0.27–4.2)
UROBILINOGEN UR QL STRIP: NORMAL
VLDLC SERPL-MCNC: 19.6 MG/DL (ref 5–40)
WBC NRBC COR # BLD: 6.65 10*3/MM3 (ref 5–10)

## 2018-04-17 PROCEDURE — G0103 PSA SCREENING: HCPCS | Performed by: INTERNAL MEDICINE

## 2018-04-17 PROCEDURE — 80061 LIPID PANEL: CPT | Performed by: INTERNAL MEDICINE

## 2018-04-17 PROCEDURE — 36415 COLL VENOUS BLD VENIPUNCTURE: CPT | Performed by: INTERNAL MEDICINE

## 2018-04-17 PROCEDURE — 83036 HEMOGLOBIN GLYCOSYLATED A1C: CPT | Performed by: INTERNAL MEDICINE

## 2018-04-17 PROCEDURE — 85025 COMPLETE CBC W/AUTO DIFF WBC: CPT | Performed by: INTERNAL MEDICINE

## 2018-04-17 PROCEDURE — 86803 HEPATITIS C AB TEST: CPT | Performed by: INTERNAL MEDICINE

## 2018-04-17 PROCEDURE — 84443 ASSAY THYROID STIM HORMONE: CPT | Performed by: INTERNAL MEDICINE

## 2018-04-17 PROCEDURE — 81003 URINALYSIS AUTO W/O SCOPE: CPT | Performed by: INTERNAL MEDICINE

## 2018-04-17 PROCEDURE — 80053 COMPREHEN METABOLIC PANEL: CPT | Performed by: INTERNAL MEDICINE

## 2018-04-23 ENCOUNTER — OFFICE VISIT (OUTPATIENT)
Dept: INTERNAL MEDICINE | Facility: CLINIC | Age: 63
End: 2018-04-23

## 2018-04-23 VITALS
BODY MASS INDEX: 25.48 KG/M2 | HEIGHT: 69 IN | RESPIRATION RATE: 14 BRPM | DIASTOLIC BLOOD PRESSURE: 74 MMHG | SYSTOLIC BLOOD PRESSURE: 120 MMHG | WEIGHT: 172 LBS

## 2018-04-23 DIAGNOSIS — Z00.00 HEALTH CARE MAINTENANCE: Primary | ICD-10-CM

## 2018-04-23 DIAGNOSIS — Z12.12 SCREENING FOR COLORECTAL CANCER: ICD-10-CM

## 2018-04-23 DIAGNOSIS — Z12.2 ENCOUNTER FOR SCREENING FOR LUNG CANCER: ICD-10-CM

## 2018-04-23 DIAGNOSIS — F17.200 TOBACCO USE DISORDER: ICD-10-CM

## 2018-04-23 DIAGNOSIS — Z12.11 SCREENING FOR COLORECTAL CANCER: ICD-10-CM

## 2018-04-23 DIAGNOSIS — E78.49 FAMILIAL HYPERLIPIDEMIA, HIGH LDL: ICD-10-CM

## 2018-04-23 PROCEDURE — 99396 PREV VISIT EST AGE 40-64: CPT | Performed by: INTERNAL MEDICINE

## 2018-04-23 NOTE — PATIENT INSTRUCTIONS
"Risk evaluation:  1. Cardiovascular risk factors: hyperlipidemia, tobacco abuse, male gender, sedentary life style.  2. Diabetes risk factors: impaired fasting blood sugars and sedentary life style.   3. Cancer risk factors: FH of prostate cancer.  4. Risky behavior: none. Use of seat belts: regular. Use of sunscreens: sporadic. SPF 30+.   Tattoos: one.   H/o blood transfusion/organ transplant before 1992 or clotting factors transfusion before 1987: none.    Prevention:   Cholesterol test up to date. Cholesterol is high..  Blood sugar test up to date. Fasting blood sugar  is elevated. You have a high risk of developing diabetes. Recommended to avoid sweets and starches: pasta, pizza, bread, potato, corn.  Regular exercise recommended..  Hep C testing (for those born [id number]): completed..  Colonoscopy Benefits and potential risks of colorectal screening had been discussed repeatedly with the patient. Colonoscopy was recommended and referral was made last year. Patient has paperwork at home, but had not scheduled colonoscopy yet. Risks and benefits discussed once again. Will refer for the test..  Testicular self exams recommended once a month. Advised that any firm testicular nodules to be reported immediately.  Prostate cancer screening and prevention. nl PSA..    Hyperlipidemia - not controlled as patient had discontinued medication. Target LDL is  < 70 mg/dl (\"very high\" risk for CHD). Patient's 198. Normal liver function tests.Regular exercise recommended. Will start Atorvastatin 10 mg.  Impaired fasting blood sugar and elevated Hb A1C - needs to limit starches and sweets in the diet, needs to exercise regularly. Will monitor. Diet discussed.           "

## 2018-05-03 ENCOUNTER — CLINICAL SUPPORT (OUTPATIENT)
Dept: OTHER | Facility: HOSPITAL | Age: 63
End: 2018-05-03

## 2018-05-03 ENCOUNTER — HOSPITAL ENCOUNTER (OUTPATIENT)
Dept: PET IMAGING | Facility: HOSPITAL | Age: 63
Discharge: HOME | End: 2018-05-03
Admitting: CLINICAL NURSE SPECIALIST

## 2018-05-03 DIAGNOSIS — F17.210 SMOKING GREATER THAN 30 PACK YEARS: ICD-10-CM

## 2018-05-03 DIAGNOSIS — Z12.2 SCREENING FOR MALIGNANT NEOPLASM OF RESPIRATORY ORGAN: Primary | ICD-10-CM

## 2018-05-03 DIAGNOSIS — Z12.2 SCREENING FOR MALIGNANT NEOPLASM OF RESPIRATORY ORGAN: ICD-10-CM

## 2018-05-03 PROCEDURE — G0296 VISIT TO DETERM LDCT ELIG: HCPCS | Performed by: CLINICAL NURSE SPECIALIST

## 2018-05-03 PROCEDURE — G0297 LDCT FOR LUNG CA SCREEN: HCPCS

## 2018-05-06 VITALS — HEIGHT: 69 IN | BODY MASS INDEX: 25.8 KG/M2 | WEIGHT: 174.2 LBS

## 2018-05-06 NOTE — PROGRESS NOTES
[facility] Low-Dose Lung Cancer CT Screening Visit    Quan Holloway is a pleasant 62 y.o. male seen today at the request of Dr. Tinsley in our Multidisciplinary Clinic, being seen for Lung Cancer Screening Counseling and a Shared Decision Making Visit prior to Low-Dose Lung Cancer Screening CT exam.  He brought his daughter Cecilia with him to assist him as needed as his primarily language is Indonesian or Portuguese.    SMOKING HISTORY:   History   Smoking Status   • Current Every Day Smoker   • Packs/day: 1.00   • Years: 40.00   • Types: Cigarettes   Smokeless Tobacco   • Never Used     Comment: Began smoking at age 12.  Quit smoking on 2 occasions in the past for a total of 10 years.  Smoked 1 ppd for a 40 pack year history.       Quan Holloway is currently smoking one pack per day with a 40 pack year history.  Reports no use of alternate forms of tobacco, electronic cigarettes, marijuana or other substances.  Based on the recommendation of the [place] Preventive Services Task Force, this patient is at high risk for lung cancer and a low-dose CT screening scan is recommended.     We discussed the connection between Radon and Lung Cancer and the availability of free test kits.  He does not have a basement in his home.  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  Some second-hand smoke exposure as a child with his father and grand-father smoking in their home.  He reports no current second-hand smoke exposure.    The patient has had no hemoptysis, unintentional weight loss or increasing shortness of breath. The patient is asymptomatic and has no signs or symptoms of lung cancer.   The patient reports no personal history of cancer.  Additionally, reports no known family history of lung cancer.  He has peripheral vascular disease but has no other comorbidities.    Together we discussed the potential benefits and potential harms of being screened for lung cancer including  the potential for follow-up diagnostic testing, risk for over diagnosis, false positive rate and total radiation exposure using the [facility] standardized decision aid. We reviewed the patient's smoking history and counseled on the importance and health benefits of quitting smoking.    Smoking Cessation  DISCUSSION HELD TODAY:     We discussed that there are a number of resources and tobacco cessation interventions to assist with smoking cessation including the 1-800-Quit Now line, Health Department programs, [place] [facility] resources, and use of the U.[place] Department of Health and Human Services five keys for quitting and quit plan worksheet.  On a scale of zero to ten, the patient rates their motivation and readiness to quit at a 0 out of 10 today.  His daughter was quite verbal today in encouraging him to quit smoking and he appeared to be frustrated by the conversation.    Recommendations for continued lung cancer screening:      We discussed the NCCN guidelines for lung cancer screening and the patient verbalized understanding that annual screening is recommended until fifteen years beyond smoking as long as they have no other disease or comorbidity that would prevent them from receiving cancer treatments such as surgery should a lung cancer be detected. The [facility] Lung Cancer Screening Shared Decision-Making Tool was utilized as an aid in discussing whether or not screening was right. The patient has decided to proceed with a Low Dose Lung Cancer Screening CT today. We were able to set the language of the CT scanner to Com[place]an in order to facilitate satisfactory communication during the scan.  The patient is aware that the results of his screening will be shared with the referring provider or PCP as well.       The patient verbalizes understanding that results of this low dose lung CT exam will be called and that assistance will be provided in arranging any necessary follow-up.   He has requested that the results be called to his daughter, Cecilia.    After review of the NCCN guidelines and recommendations for ongoing screening, the patient verbalized understanding of recommendations for follow-up. We discussed the importance of adherence to continued annual screening until 15 years beyond smoking or until other life-limiting comorbidities are present. We discussed the impact of comorbidities and ability and willing to undergo diagnosis and treatment.    The patient has been counseled on the health benefits of quitting tobacco, smoking cessation strategies and resources, as well as the importance of adherence to annual lung cancer low-dose CT screening.     Sadia Dowling, MSN, APRN, ACNS-BC, AOCN, CHPN  Clinical Nurse Specialist  [facility]

## 2018-05-22 ENCOUNTER — OFFICE VISIT (OUTPATIENT)
Dept: INTERNAL MEDICINE | Facility: CLINIC | Age: 63
End: 2018-05-22

## 2018-05-22 VITALS
HEART RATE: 73 BPM | SYSTOLIC BLOOD PRESSURE: 122 MMHG | HEIGHT: 69 IN | DIASTOLIC BLOOD PRESSURE: 54 MMHG | BODY MASS INDEX: 26.07 KG/M2 | WEIGHT: 176 LBS | OXYGEN SATURATION: 96 %

## 2018-05-22 DIAGNOSIS — M70.51 PES ANSERINUS BURSITIS OF RIGHT KNEE: Primary | ICD-10-CM

## 2018-05-22 PROBLEM — M25.361 KNEE INSTABILITY, RIGHT: Status: RESOLVED | Noted: 2017-12-21 | Resolved: 2018-05-22

## 2018-05-22 PROCEDURE — 99214 OFFICE O/P EST MOD 30 MIN: CPT | Performed by: INTERNAL MEDICINE

## 2018-05-22 RX ORDER — ATORVASTATIN CALCIUM 10 MG/1
10 TABLET, FILM COATED ORAL DAILY
Refills: 0 | COMMUNITY
Start: 2018-04-26 | End: 2018-07-20 | Stop reason: SDUPTHER

## 2018-05-22 NOTE — PROGRESS NOTES
Subjective   Quan Holloway is a 62 y.o. male.     History of Present Illness   Patient is complaining of the right knee medial pain a bit lower then the midline - no trauma. Started abruptly  5 days ago. No morning stiffness. He had not tried medication. States that the pain is with weight bearing. Has sensation of the instability in the knee.  The following portions of the patient's history were reviewed and updated as appropriate: allergies, current medications, past family history, past medical history, past social history, past surgical history and problem list.    Review of Systems   Constitutional: Negative for chills and fever.   Eyes: Negative for pain and redness.   Respiratory: Negative for cough and shortness of breath.    Cardiovascular: Positive for leg swelling. Negative for chest pain.   Neurological: Negative for dizziness and headaches.       Objective   Physical Exam   Constitutional: He is oriented to person, place, and time. He appears well-developed and well-nourished.   HENT:   Head: Normocephalic and atraumatic.   Right Ear: Tympanic membrane, external ear and ear canal normal.   Left Ear: Tympanic membrane, external ear and ear canal normal.   Nose: Nose normal. Right sinus exhibits no maxillary sinus tenderness and no frontal sinus tenderness. Left sinus exhibits no maxillary sinus tenderness and no frontal sinus tenderness.   Mouth/Throat: Uvula is midline, oropharynx is clear and moist and mucous membranes are normal.   Eyes: Conjunctivae and EOM are normal. Pupils are equal, round, and reactive to light. Right eye exhibits no discharge. Left eye exhibits no discharge. No scleral icterus.   Neck: Neck supple. No JVD present.   Cardiovascular: Normal rate, regular rhythm and normal heart sounds.  Exam reveals no gallop and no friction rub.    No murmur heard.  Pulmonary/Chest: Effort normal and breath sounds normal. He has no wheezes. He has no rales.   Musculoskeletal: He exhibits  "tenderness (right knee medially below the midline). He exhibits no edema.   Lymphadenopathy:     He has no cervical adenopathy.   Neurological: He is alert and oriented to person, place, and time. No cranial nerve deficit.   Skin: Skin is warm and dry. No rash noted.   Psychiatric: He has a normal mood and affect. His behavior is normal.   Vitals reviewed.  /54 (BP Location: Left arm, Patient Position: Sitting, Cuff Size: Adult)   Pulse 73   Ht 174 cm (68.5\")   Wt 79.8 kg (176 lb)   SpO2 96%   BMI 26.37 kg/m²   Risks and benefits of the steroid injection procedure were discussed with the patient and the patient verbalized understanding and agreed to proceed.    Procedure note:  right knee was prepped and draped in the usual sterile fasion. Using medial approach local anaesthesia was applied with Ether aerosol and 2% Lidocaine 0.5 ml was injected. Then 1 ml of Dexamethasone was injected into the joint space without difficulties. Patient tolerated procedure well. No bleeding. Band Aid applied.    The patient was advised on activity limitations for the next 3 days.   Call the office if fever, increased pain, increased swelling or redness.  Follow up as needed.    Assessment/Plan   Quan was seen today for knee pain.    Diagnoses and all orders for this visit:    Pes anserinus bursitis of right knee    Given steroid injection., Tolerated well. Rest, ice and straight leg raising exercise. Try pull on knee sleeve. Call if not better.           "

## 2018-05-22 NOTE — PATIENT INSTRUCTIONS
Given steroid injection., Tolerated well. Rest, ice and straight leg raising exercise. Try pull on knee sleeve. Call if not better.

## 2018-06-19 ENCOUNTER — TELEPHONE (OUTPATIENT)
Dept: INTERNAL MEDICINE | Facility: CLINIC | Age: 63
End: 2018-06-19

## 2018-06-19 NOTE — TELEPHONE ENCOUNTER
----- Message from Erma Chanel sent at 6/19/2018 11:53 AM EDT -----  Contact: Patient  Patient was seen at Urgent care yesterday for vertigo and needs to see Dr. Tinsley to get a release for work. Please advise    CROW:[phone]

## 2018-06-19 NOTE — TELEPHONE ENCOUNTER
How long did they ask him to stay off work? How is the vertigo? Does he take Meclizine and does it help?

## 2018-06-20 ENCOUNTER — OFFICE VISIT (OUTPATIENT)
Dept: INTERNAL MEDICINE | Facility: CLINIC | Age: 63
End: 2018-06-20

## 2018-06-20 VITALS
DIASTOLIC BLOOD PRESSURE: 62 MMHG | SYSTOLIC BLOOD PRESSURE: 112 MMHG | RESPIRATION RATE: 14 BRPM | HEIGHT: 68 IN | WEIGHT: 173 LBS | BODY MASS INDEX: 26.22 KG/M2

## 2018-06-20 DIAGNOSIS — M70.51 PES ANSERINUS BURSITIS OF RIGHT KNEE: ICD-10-CM

## 2018-06-20 DIAGNOSIS — I95.1 ORTHOSTATIC HYPOTENSION: Primary | ICD-10-CM

## 2018-06-20 DIAGNOSIS — H81.23 VESTIBULAR NEURONITIS OF BOTH EARS: ICD-10-CM

## 2018-06-20 LAB
BUN SERPL-MCNC: 16 MG/DL (ref 8–23)
BUN/CREAT SERPL: 16.8 (ref 7–25)
CALCIUM SERPL-MCNC: 9.5 MG/DL (ref 8.6–10.5)
CHLORIDE SERPL-SCNC: 103 MMOL/L (ref 98–107)
CO2 SERPL-SCNC: 26.5 MMOL/L (ref 22–29)
CREAT SERPL-MCNC: 0.95 MG/DL (ref 0.76–1.27)
GLUCOSE SERPL-MCNC: 212 MG/DL (ref 65–99)
POTASSIUM SERPL-SCNC: 3.9 MMOL/L (ref 3.5–5.2)
SODIUM SERPL-SCNC: 142 MMOL/L (ref 136–145)

## 2018-06-20 PROCEDURE — 99214 OFFICE O/P EST MOD 30 MIN: CPT | Performed by: INTERNAL MEDICINE

## 2018-06-20 RX ORDER — MECLIZINE HYDROCHLORIDE 25 MG/1
25 TABLET ORAL
COMMUNITY
Start: 2018-06-18 | End: 2018-07-18

## 2018-06-20 NOTE — PROGRESS NOTES
Subjective   Quan Holloway is a 63 y.o. male.     History of Present Illness     Quan Holloway  presents for [facility] visit f/u. Patient had to seek treatment at the mentioned above facility for the dizziness. Symptoms started night prior to the visit. Quan Holloway was diagnosed with vertigo. Tests and procedures done: none. Treatments: Meclizine. Patient was discharged home. Medication prescribed: Meclizine.  Patient reports that at home condition improved. Patient had been compliant with recommendations and medication.  Records reviewed and discussed with patient.    He still suffers with unsteadiness when he bends over or gets up from the sitting or laying down position.Denies palpitations or chest pains. Poor fluids intake.    Patient had been seen her a month ago, with c/o knee pain. We had given him steroid injection. No improvement. There is some instability in the knee and occasional sharp pains, no edema. No use OTC meds.       The following portions of the patient's history were reviewed and updated as appropriate: allergies, current medications, past family history, past medical history, past social history, past surgical history and problem list.    Review of Systems   Constitutional: Negative for chills and fever.   Eyes: Negative for pain and redness.   Respiratory: Negative for cough and shortness of breath.    Cardiovascular: Negative for chest pain and leg swelling.   Gastrointestinal: Positive for nausea.   Neurological: Positive for dizziness. Negative for headaches.     Orthostatic blood pressure    Lying 114/64 P: 67  Sitting 98/60 P: 77  Standing 90/60 P: 83       Objective   Physical Exam   Constitutional: He is oriented to person, place, and time. He appears well-developed and well-nourished.   HENT:   Head: Normocephalic and atraumatic.   Right Ear: Tympanic membrane, external ear and ear canal normal.   Left Ear: Tympanic membrane, external ear and ear canal normal.   Nose: Nose normal.  Right sinus exhibits no maxillary sinus tenderness and no frontal sinus tenderness. Left sinus exhibits no maxillary sinus tenderness and no frontal sinus tenderness.   Mouth/Throat: Uvula is midline, oropharynx is clear and moist and mucous membranes are normal.   Eyes: Conjunctivae and EOM are normal. Pupils are equal, round, and reactive to light. Right eye exhibits no discharge. Left eye exhibits no discharge. No scleral icterus.   Neck: Neck supple. No JVD present.   Cardiovascular: Normal rate, regular rhythm and normal heart sounds.  Exam reveals no gallop and no friction rub.    No murmur heard.  Pulmonary/Chest: Effort normal and breath sounds normal. He has no wheezes. He has no rales.   Musculoskeletal: He exhibits tenderness (knee R on ROM). He exhibits no edema.   Lymphadenopathy:     He has no cervical adenopathy.   Neurological: He is alert and oriented to person, place, and time. No cranial nerve deficit.   Negative Baranyi manuver both sides   Skin: Skin is warm and dry. No rash noted.   Psychiatric: He has a normal mood and affect. His behavior is normal.   Vitals reviewed.      Assessment/Plan   Quan was seen today for dizziness and leg pain.    Diagnoses and all orders for this visit:    Orthostatic hypotension  -     Basic Metabolic Panel; Future    Pes anserinus bursitis of right knee  -     Ambulatory Referral to Orthopedic Surgery    Vestibular neuronitis of both ears      1. Dizziness - due to orthostatic hypotension - reassured. Needs to drink more fluids and OK to increase salt in the diet. Will check electrolytes.  2. Knee pain - refer to ortho.  3. Vestibular neuronitis - much improved, finish Meclizine. Hydrate well.

## 2018-06-22 PROBLEM — E11.9 DIABETES MELLITUS TYPE 2, DIET-CONTROLLED (HCC): Status: ACTIVE | Noted: 2018-06-22

## 2018-07-13 ENCOUNTER — LAB (OUTPATIENT)
Dept: INTERNAL MEDICINE | Facility: CLINIC | Age: 63
End: 2018-07-13

## 2018-07-13 DIAGNOSIS — E78.49 FAMILIAL HYPERLIPIDEMIA, HIGH LDL: ICD-10-CM

## 2018-07-13 LAB
ALBUMIN SERPL-MCNC: 4.4 G/DL (ref 3.5–5.2)
ALBUMIN/GLOB SERPL: 1.6 G/DL
ALP SERPL-CCNC: 86 U/L (ref 39–117)
ALT SERPL W P-5'-P-CCNC: 17 U/L (ref 1–41)
ANION GAP SERPL CALCULATED.3IONS-SCNC: 13.7 MMOL/L
AST SERPL-CCNC: 10 U/L (ref 1–40)
BILIRUB SERPL-MCNC: 0.6 MG/DL (ref 0.1–1.2)
BUN BLD-MCNC: 17 MG/DL (ref 8–23)
BUN/CREAT SERPL: 19.1 (ref 7–25)
CALCIUM SPEC-SCNC: 9.3 MG/DL (ref 8.6–10.5)
CHLORIDE SERPL-SCNC: 104 MMOL/L (ref 98–107)
CHOLEST SERPL-MCNC: 182 MG/DL (ref 0–200)
CK SERPL-CCNC: 104 U/L (ref 20–200)
CO2 SERPL-SCNC: 26.3 MMOL/L (ref 22–29)
CREAT BLD-MCNC: 0.89 MG/DL (ref 0.76–1.27)
GFR SERPL CREATININE-BSD FRML MDRD: 87 ML/MIN/1.73
GLOBULIN UR ELPH-MCNC: 2.7 GM/DL
GLUCOSE BLD-MCNC: 103 MG/DL (ref 65–99)
HDLC SERPL-MCNC: 55 MG/DL (ref 40–60)
LDLC SERPL CALC-MCNC: 111 MG/DL (ref 0–100)
LDLC/HDLC SERPL: 2.02 {RATIO}
POTASSIUM BLD-SCNC: 4 MMOL/L (ref 3.5–5.2)
PROT SERPL-MCNC: 7.1 G/DL (ref 6–8.5)
SODIUM BLD-SCNC: 144 MMOL/L (ref 136–145)
TRIGL SERPL-MCNC: 79 MG/DL (ref 0–150)
VLDLC SERPL-MCNC: 15.8 MG/DL (ref 5–40)

## 2018-07-13 PROCEDURE — 80061 LIPID PANEL: CPT | Performed by: INTERNAL MEDICINE

## 2018-07-13 PROCEDURE — 80053 COMPREHEN METABOLIC PANEL: CPT | Performed by: INTERNAL MEDICINE

## 2018-07-18 ENCOUNTER — OFFICE VISIT (OUTPATIENT)
Dept: INTERNAL MEDICINE | Facility: CLINIC | Age: 63
End: 2018-07-18

## 2018-07-18 VITALS
DIASTOLIC BLOOD PRESSURE: 62 MMHG | SYSTOLIC BLOOD PRESSURE: 100 MMHG | TEMPERATURE: 98.1 F | HEIGHT: 68 IN | WEIGHT: 173 LBS | BODY MASS INDEX: 26.22 KG/M2 | HEART RATE: 78 BPM | OXYGEN SATURATION: 95 %

## 2018-07-18 DIAGNOSIS — S83.241A TEAR OF MEDIAL MENISCUS OF RIGHT KNEE, UNSPECIFIED TEAR TYPE, UNSPECIFIED WHETHER OLD OR CURRENT TEAR, INITIAL ENCOUNTER: Primary | ICD-10-CM

## 2018-07-18 PROCEDURE — 99213 OFFICE O/P EST LOW 20 MIN: CPT | Performed by: NURSE PRACTITIONER

## 2018-07-18 NOTE — PROGRESS NOTES
"Subjective   Quan Holloway is a 63 y.o. male who presents due to right medial knee pain.    He has seen Dr. Browning regarding right knee pain, MRI showed meniscal tear. Surgery is planned for early August. He was doing relatively well until yesterday at work, c/o sharp pain and \"clicking\" sensation in the right medial knee. Since then he c/o difficulty walking with weakness in the right leg. Denies swelling.      Knee Pain    The pain is present in the right knee. The quality of the pain is described as aching. The pain is moderate. Associated symptoms include a loss of motion. Pertinent negatives include no numbness or tingling. The symptoms are aggravated by weight bearing. He has tried NSAIDs for the symptoms. The treatment provided mild relief.        The following portions of the patient's history were reviewed and updated as appropriate: allergies, current medications, past social history and problem list.    Past Medical History:   Diagnosis Date   • Arthritis    • Blindness of left eye     since birth   • Low back pain    • Osteoarthritis of spine with radiculopathy, cervical region 3/10/2016         Current Outpatient Prescriptions:   •  atorvastatin (LIPITOR) 10 MG tablet, Take 10 mg by mouth Daily., Disp: , Rfl: 0  •  tamsulosin (FLOMAX) 0.4 MG capsule 24 hr capsule, Take 1 capsule by mouth Every Night., Disp: 30 capsule, Rfl: 11    No Known Allergies    Review of Systems   Constitutional: Negative for chills, fatigue, fever and unexpected weight change.   HENT: Negative for congestion, ear pain, postnasal drip, sinus pressure, sore throat and trouble swallowing.    Eyes: Negative for visual disturbance.   Respiratory: Negative for cough, chest tightness and wheezing.    Cardiovascular: Negative for chest pain and palpitations.   Gastrointestinal: Negative for abdominal pain.   Genitourinary: Negative for dysuria.   Musculoskeletal: Positive for arthralgias.   Skin: Negative for color change. " "  Neurological: Positive for weakness. Negative for tingling, syncope, numbness and headaches.   Hematological: Does not bruise/bleed easily.       Objective   Vitals:    07/18/18 0939   BP: 100/62   BP Location: Left arm   Patient Position: Sitting   Cuff Size: Adult   Pulse: 78   Temp: 98.1 °F (36.7 °C)   TempSrc: Oral   SpO2: 95%   Weight: 78.5 kg (173 lb)   Height: 172.7 cm (68\")     Physical Exam   Constitutional: He appears well-developed and well-nourished. He is cooperative. He does not have a sickly appearance. He does not appear ill.   HENT:   Head: Normocephalic.   Right Ear: Hearing and tympanic membrane normal.   Left Ear: Hearing and tympanic membrane normal.   Nose: No mucosal edema, rhinorrhea, sinus tenderness or nasal deformity. Right sinus exhibits no maxillary sinus tenderness and no frontal sinus tenderness. Left sinus exhibits no maxillary sinus tenderness and no frontal sinus tenderness.   Mouth/Throat: Mucous membranes are normal. Normal dentition.   Eyes: Lids are normal. Right eye exhibits no exudate. Left eye exhibits no exudate.   Neck: Trachea normal. Carotid bruit is not present. No edema present. No thyroid mass present.   Cardiovascular: Regular rhythm, normal heart sounds and normal pulses.    No murmur heard.  Pulmonary/Chest: Breath sounds normal. No respiratory distress. He has no decreased breath sounds. He has no wheezes. He has no rhonchi. He has no rales.   Musculoskeletal:        Right knee: He exhibits decreased range of motion. He exhibits no swelling. Tenderness found. Medial joint line tenderness noted.   Increased pain with rotation of right leg, Ambulating with limp   Lymphadenopathy:        Head (right side): No submental, no submandibular, no tonsillar, no preauricular, no posterior auricular and no occipital adenopathy present.        Head (left side): No submental, no submandibular, no tonsillar, no preauricular, no posterior auricular and no occipital adenopathy " present.   Neurological: He is alert.   Skin: Skin is warm, dry and intact. No cyanosis. Nails show no clubbing.       Assessment/Plan   Quan was seen today for knee pain.    Diagnoses and all orders for this visit:    Tear of medial meniscus of right knee, unspecified tear type, unspecified whether old or current tear, initial encounter    He has surgery scheduled for early August with Dr. Browning. However, he c/o acute pain with decreased range of motion since yesterday. Unfortunately his job requires persistent walking (10 hours) which is difficult considering current injury. He is placed off work until his f/u with Dr. Tinsley on 7/20 at which time we will re-evaluate and determine if he is able to return to work or will need to be off until surgery is completed.

## 2018-07-20 ENCOUNTER — OFFICE VISIT (OUTPATIENT)
Dept: INTERNAL MEDICINE | Facility: CLINIC | Age: 63
End: 2018-07-20

## 2018-07-20 ENCOUNTER — TELEPHONE (OUTPATIENT)
Dept: INTERNAL MEDICINE | Facility: CLINIC | Age: 63
End: 2018-07-20

## 2018-07-20 VITALS
SYSTOLIC BLOOD PRESSURE: 120 MMHG | BODY MASS INDEX: 25.91 KG/M2 | RESPIRATION RATE: 14 BRPM | WEIGHT: 171 LBS | HEIGHT: 68 IN | DIASTOLIC BLOOD PRESSURE: 72 MMHG

## 2018-07-20 DIAGNOSIS — S83.206D ACUTE MENISCAL TEAR OF KNEE, RIGHT, SUBSEQUENT ENCOUNTER: ICD-10-CM

## 2018-07-20 DIAGNOSIS — D12.5 ADENOMATOUS POLYP OF SIGMOID COLON: ICD-10-CM

## 2018-07-20 DIAGNOSIS — M72.0 DUPUYTREN'S CONTRACTURE: ICD-10-CM

## 2018-07-20 DIAGNOSIS — E11.9 DIABETES MELLITUS TYPE 2, DIET-CONTROLLED (HCC): ICD-10-CM

## 2018-07-20 DIAGNOSIS — E78.49 FAMILIAL HYPERLIPIDEMIA, HIGH LDL: Primary | ICD-10-CM

## 2018-07-20 PROBLEM — I95.1 ORTHOSTATIC HYPOTENSION: Status: RESOLVED | Noted: 2018-06-20 | Resolved: 2018-07-20

## 2018-07-20 PROBLEM — R73.01 IMPAIRED FASTING BLOOD SUGAR: Status: RESOLVED | Noted: 2018-04-17 | Resolved: 2018-07-20

## 2018-07-20 PROCEDURE — 99214 OFFICE O/P EST MOD 30 MIN: CPT | Performed by: INTERNAL MEDICINE

## 2018-07-20 RX ORDER — ATORVASTATIN CALCIUM 10 MG/1
10 TABLET, FILM COATED ORAL DAILY
Qty: 90 TABLET | Refills: 3 | Status: SHIPPED | OUTPATIENT
Start: 2018-07-20 | End: 2019-05-03

## 2018-07-20 NOTE — PROGRESS NOTES
"Subjective   Quan Holloway is a 63 y.o. male. Here complaining of the right knee pain. Here for hyperlipidemia and DM f/u.    History of Present Illness     Quan Holloway 63 y.o. male presents today for hyperlipidemia f/u. Last was seen on 05/22/2018 and on that visit medication was changed due to inadequate control.We had started Atorvastatin.  Patient is compliant with treatment and denies  side effects.    Quan Holloway 63 y.o. male presents today for diabetes mellitus f/u. Last was seen on 05/22/2018 and on that visit he was d=iagnosed with DM.We had diet had been discussed and diabetic education had been recommended..  Patient is compliant with treatment and had changed his diet.Jeff henry had been struggling with the right knee pain and instability: had been seen in acute clinic 2 days ago due to worsening pain, most likely due to working long shifts at work and standing for long time. He is using sleeve on the knee, and had been trying to rest. He is under the care of  and had been diagnosed with meniscus tear - surgery is scheduled for 08/01/2018.    Patient had C-scope today and had been diagnosed with 3 small (3-5 mm polyps). No complains.    Patient continue suffering with pains in both hands. He has Dupiutrens contracture.Had been suffering for the last 2 years with slow progression.    /72 (BP Location: Left arm, Patient Position: Sitting, Cuff Size: Adult)   Resp 14   Ht 172.7 cm (68\")   Wt 77.6 kg (171 lb)   BMI 26.00 kg/m²     Current Outpatient Prescriptions:   •  atorvastatin (LIPITOR) 10 MG tablet, Take 10 mg by mouth Daily., Disp: , Rfl: 0  •  tamsulosin (FLOMAX) 0.4 MG capsule 24 hr capsule, Take 1 capsule by mouth Every Night., Disp: 30 capsule, Rfl: 11     The following portions of the patient's history were reviewed and updated as appropriate: allergies, current medications, past family history, past medical history, past social history, past surgical history and problem " list.    Review of Systems   Constitutional: Negative for chills and fever.   Eyes: Negative for pain and redness.   Respiratory: Negative for cough and shortness of breath.    Cardiovascular: Negative for chest pain and leg swelling.   Musculoskeletal: Positive for arthralgias (right knee) and gait problem.   Neurological: Positive for dizziness. Negative for headaches.       Objective   Physical Exam   Constitutional: He is oriented to person, place, and time. He appears well-developed and well-nourished.   HENT:   Head: Normocephalic and atraumatic.   Right Ear: Tympanic membrane, external ear and ear canal normal.   Left Ear: Tympanic membrane, external ear and ear canal normal.   Nose: Nose normal. Right sinus exhibits no maxillary sinus tenderness and no frontal sinus tenderness. Left sinus exhibits no maxillary sinus tenderness and no frontal sinus tenderness.   Mouth/Throat: Uvula is midline, oropharynx is clear and moist and mucous membranes are normal.   Eyes: Pupils are equal, round, and reactive to light. Conjunctivae and EOM are normal. Right eye exhibits no discharge. Left eye exhibits no discharge. No scleral icterus.   Neck: Neck supple. No JVD present.   Cardiovascular: Normal rate, regular rhythm and normal heart sounds.  Exam reveals no gallop and no friction rub.    No murmur heard.  Pulmonary/Chest: Effort normal and breath sounds normal. He has no wheezes. He has no rales.   Musculoskeletal: He exhibits tenderness (on palpation right knee on ROM, ther is some instability) and deformity (both palms  - firm tender ridges). He exhibits no edema.   Lymphadenopathy:     He has no cervical adenopathy.   Neurological: He is alert and oriented to person, place, and time. No cranial nerve deficit.   Skin: Skin is warm and dry. No rash noted.   Psychiatric: He has a normal mood and affect. His behavior is normal.   Vitals reviewed.      Assessment/Plan   Quan was seen today for hyperlipidemia, knee  "pain, hand pain and diabetes.    Diagnoses and all orders for this visit:    Familial hyperlipidemia, high LDL    Adenomatous polyp of sigmoid colon    Diabetes mellitus type 2, diet-controlled (CMS/[facility])  -     Comprehensive Metabolic Panel; Future  -     Hemoglobin A1c; Future    Dupuytren's contracture  -     Ambulatory Referral to Hand Surgery    Acute meniscal tear of knee, right, subsequent encounter    1.Hyperlipidemia - well controlled with current  medication and low fat diet. Target LDL is  < 70 mg/dl (\"very high\" risk for CHD). Patient's 111. Normal liver function tests. Continue same medication and diet.Regular exercise recommended. Needs to quit tobacco.   2. DM II - fasting blood sugar much improved.   3. Colon polyps - C-scope results discussed.  4. Dupuytren's contracture - refer to .  5. Acute meniscal tear in the right knee - unfortunately he is not going to be able to gp back to work before the surgery scheduled for 08/01/2018. Will recommend rest and leg elevation, use over the counter Tylenol if needed,           "

## 2018-07-20 NOTE — TELEPHONE ENCOUNTER
----- Message from Janet Alfredo sent at 7/20/2018  4:36 PM EDT -----  Contact: pt  Patient was seen in the office today. He needs to have his atorvastatin (LIPITOR) 10 MG tablet refilled as he is already out. Please send it to     [facility] [address] [place], [place] - [address] [place] [address] - [phone]  - [phone] FX Phone:  [phone] Fax:  [phone]   Address:  [address] [place] [id number]       Thank you

## 2018-07-23 ENCOUNTER — HOSPITAL ENCOUNTER (OUTPATIENT)
Dept: GENERAL RADIOLOGY | Facility: HOSPITAL | Age: 63
Discharge: HOME | End: 2018-07-23
Attending: ORTHOPAEDIC SURGERY

## 2018-07-23 ENCOUNTER — TRANSCRIBE ORDERS (OUTPATIENT)
Dept: LAB | Facility: HOSPITAL | Age: 63
End: 2018-07-23

## 2018-07-23 ENCOUNTER — LAB (OUTPATIENT)
Dept: LAB | Facility: HOSPITAL | Age: 63
End: 2018-07-23
Attending: ORTHOPAEDIC SURGERY

## 2018-07-23 ENCOUNTER — HOSPITAL ENCOUNTER (OUTPATIENT)
Dept: CARDIOLOGY | Facility: HOSPITAL | Age: 63
Discharge: HOME | End: 2018-07-23
Attending: ORTHOPAEDIC SURGERY | Admitting: ORTHOPAEDIC SURGERY

## 2018-07-23 DIAGNOSIS — Z01.818 PRE-OP TESTING: ICD-10-CM

## 2018-07-23 DIAGNOSIS — Z01.811 PRE-OP CHEST EXAM: ICD-10-CM

## 2018-07-23 DIAGNOSIS — M25.50 ARTHRALGIA, UNSPECIFIED JOINT: ICD-10-CM

## 2018-07-23 DIAGNOSIS — M25.50 ARTHRALGIA, UNSPECIFIED JOINT: Primary | ICD-10-CM

## 2018-07-23 LAB
ALBUMIN SERPL-MCNC: 4.4 G/DL (ref 3.5–5.2)
ALBUMIN/GLOB SERPL: 1.5 G/DL
ALP SERPL-CCNC: 86 U/L (ref 39–117)
ALT SERPL W P-5'-P-CCNC: 22 U/L (ref 1–41)
ANION GAP SERPL CALCULATED.3IONS-SCNC: 12.9 MMOL/L
AST SERPL-CCNC: 12 U/L (ref 1–40)
BASOPHILS # BLD AUTO: 0.02 10*3/MM3 (ref 0–0.2)
BASOPHILS NFR BLD AUTO: 0.2 % (ref 0–1.5)
BILIRUB SERPL-MCNC: 0.6 MG/DL (ref 0.1–1.2)
BILIRUB UR QL STRIP: NEGATIVE
BUN BLD-MCNC: 12 MG/DL (ref 8–23)
BUN/CREAT SERPL: 12.5 (ref 7–25)
CALCIUM SPEC-SCNC: 9.3 MG/DL (ref 8.6–10.5)
CHLORIDE SERPL-SCNC: 104 MMOL/L (ref 98–107)
CLARITY UR: CLEAR
CO2 SERPL-SCNC: 26.1 MMOL/L (ref 22–29)
COLOR UR: YELLOW
CREAT BLD-MCNC: 0.96 MG/DL (ref 0.76–1.27)
DEPRECATED RDW RBC AUTO: 44 FL (ref 37–54)
EOSINOPHIL # BLD AUTO: 0.39 10*3/MM3 (ref 0–0.7)
EOSINOPHIL NFR BLD AUTO: 4.1 % (ref 0.3–6.2)
ERYTHROCYTE [DISTWIDTH] IN BLOOD BY AUTOMATED COUNT: 13.1 % (ref 11.5–14.5)
GFR SERPL CREATININE-BSD FRML MDRD: 80 ML/MIN/1.73
GLOBULIN UR ELPH-MCNC: 2.9 GM/DL
GLUCOSE BLD-MCNC: 105 MG/DL (ref 65–99)
GLUCOSE UR STRIP-MCNC: NEGATIVE MG/DL
HCT VFR BLD AUTO: 46.2 % (ref 40.4–52.2)
HGB BLD-MCNC: 16.1 G/DL (ref 13.7–17.6)
HGB UR QL STRIP.AUTO: NEGATIVE
IMM GRANULOCYTES # BLD: 0.01 10*3/MM3 (ref 0–0.03)
IMM GRANULOCYTES NFR BLD: 0.1 % (ref 0–0.5)
KETONES UR QL STRIP: NEGATIVE
LEUKOCYTE ESTERASE UR QL STRIP.AUTO: NEGATIVE
LYMPHOCYTES # BLD AUTO: 2.63 10*3/MM3 (ref 0.9–4.8)
LYMPHOCYTES NFR BLD AUTO: 27.9 % (ref 19.6–45.3)
MCH RBC QN AUTO: 32.1 PG (ref 27–32.7)
MCHC RBC AUTO-ENTMCNC: 34.8 G/DL (ref 32.6–36.4)
MCV RBC AUTO: 92.2 FL (ref 79.8–96.2)
MONOCYTES # BLD AUTO: 0.39 10*3/MM3 (ref 0.2–1.2)
MONOCYTES NFR BLD AUTO: 4.1 % (ref 5–12)
NEUTROPHILS # BLD AUTO: 5.99 10*3/MM3 (ref 1.9–8.1)
NEUTROPHILS NFR BLD AUTO: 63.7 % (ref 42.7–76)
NITRITE UR QL STRIP: NEGATIVE
PH UR STRIP.AUTO: <=5 [PH] (ref 5–8)
PLATELET # BLD AUTO: 197 10*3/MM3 (ref 140–500)
PMV BLD AUTO: 11.3 FL (ref 6–12)
POTASSIUM BLD-SCNC: 4.3 MMOL/L (ref 3.5–5.2)
PROT SERPL-MCNC: 7.3 G/DL (ref 6–8.5)
PROT UR QL STRIP: NEGATIVE
RBC # BLD AUTO: 5.01 10*6/MM3 (ref 4.6–6)
SODIUM BLD-SCNC: 143 MMOL/L (ref 136–145)
SP GR UR STRIP: 1.03 (ref 1–1.03)
UROBILINOGEN UR QL STRIP: NORMAL
WBC NRBC COR # BLD: 9.42 10*3/MM3 (ref 4.5–10.7)

## 2018-07-23 PROCEDURE — 36415 COLL VENOUS BLD VENIPUNCTURE: CPT

## 2018-07-23 PROCEDURE — 85025 COMPLETE CBC W/AUTO DIFF WBC: CPT

## 2018-07-23 PROCEDURE — 93005 ELECTROCARDIOGRAM TRACING: CPT | Performed by: ORTHOPAEDIC SURGERY

## 2018-07-23 PROCEDURE — 71046 X-RAY EXAM CHEST 2 VIEWS: CPT

## 2018-07-23 PROCEDURE — 80053 COMPREHEN METABOLIC PANEL: CPT

## 2018-07-23 PROCEDURE — 93010 ELECTROCARDIOGRAM REPORT: CPT | Performed by: INTERNAL MEDICINE

## 2018-07-23 PROCEDURE — 81003 URINALYSIS AUTO W/O SCOPE: CPT

## 2018-08-10 ENCOUNTER — TELEPHONE (OUTPATIENT)
Dept: INTERNAL MEDICINE | Facility: CLINIC | Age: 63
End: 2018-08-10

## 2018-08-10 NOTE — TELEPHONE ENCOUNTER
Left message for representative to return our phone call to attempt to answer any questions needed.

## 2018-08-10 NOTE — TELEPHONE ENCOUNTER
----- Message from Erma Chanel sent at 8/7/2018  2:56 PM EDT -----  Contact: Galina Moj[employer] calling in regards to a claim filled out by Dr. Tinsley. Please advise      Galina:[phone] EXT:[phone]

## 2018-08-16 NOTE — TELEPHONE ENCOUNTER
Patients daughter dropped off paperwork for corrections to be made per insurance company. Corrections have been made, paper work is at the  ready for , family notified, copy placed for scanning into patients chart

## 2018-10-19 ENCOUNTER — LAB (OUTPATIENT)
Dept: INTERNAL MEDICINE | Facility: CLINIC | Age: 63
End: 2018-10-19

## 2018-10-19 DIAGNOSIS — E11.9 DIABETES MELLITUS TYPE 2, DIET-CONTROLLED (HCC): ICD-10-CM

## 2018-10-19 LAB
ALBUMIN SERPL-MCNC: 4.5 G/DL (ref 3.5–5.2)
ALBUMIN/GLOB SERPL: 1.6 G/DL
ALP SERPL-CCNC: 89 U/L (ref 39–117)
ALT SERPL W P-5'-P-CCNC: 28 U/L (ref 1–41)
ANION GAP SERPL CALCULATED.3IONS-SCNC: 9.7 MMOL/L
AST SERPL-CCNC: 13 U/L (ref 1–40)
BILIRUB SERPL-MCNC: 0.5 MG/DL (ref 0.1–1.2)
BUN BLD-MCNC: 20 MG/DL (ref 8–23)
BUN/CREAT SERPL: 22.7 (ref 7–25)
CALCIUM SPEC-SCNC: 9.5 MG/DL (ref 8.6–10.5)
CHLORIDE SERPL-SCNC: 103 MMOL/L (ref 98–107)
CO2 SERPL-SCNC: 26.3 MMOL/L (ref 22–29)
CREAT BLD-MCNC: 0.88 MG/DL (ref 0.76–1.27)
GFR SERPL CREATININE-BSD FRML MDRD: 88 ML/MIN/1.73
GLOBULIN UR ELPH-MCNC: 2.9 GM/DL
GLUCOSE BLD-MCNC: 123 MG/DL (ref 65–99)
HBA1C MFR BLD: 5.89 % (ref 4.8–5.6)
POTASSIUM BLD-SCNC: 4.2 MMOL/L (ref 3.5–5.2)
PROT SERPL-MCNC: 7.4 G/DL (ref 6–8.5)
SODIUM BLD-SCNC: 139 MMOL/L (ref 136–145)

## 2018-10-19 PROCEDURE — 83036 HEMOGLOBIN GLYCOSYLATED A1C: CPT | Performed by: INTERNAL MEDICINE

## 2018-10-19 PROCEDURE — 36415 COLL VENOUS BLD VENIPUNCTURE: CPT | Performed by: INTERNAL MEDICINE

## 2018-10-19 PROCEDURE — 80053 COMPREHEN METABOLIC PANEL: CPT | Performed by: INTERNAL MEDICINE

## 2018-10-26 ENCOUNTER — OFFICE VISIT (OUTPATIENT)
Dept: INTERNAL MEDICINE | Facility: CLINIC | Age: 63
End: 2018-10-26

## 2018-10-26 VITALS
RESPIRATION RATE: 14 BRPM | BODY MASS INDEX: 26.98 KG/M2 | HEIGHT: 68 IN | WEIGHT: 178 LBS | SYSTOLIC BLOOD PRESSURE: 142 MMHG | DIASTOLIC BLOOD PRESSURE: 60 MMHG

## 2018-10-26 DIAGNOSIS — Z00.00 HEALTH CARE MAINTENANCE: Primary | ICD-10-CM

## 2018-10-26 PROCEDURE — 99213 OFFICE O/P EST LOW 20 MIN: CPT | Performed by: INTERNAL MEDICINE

## 2018-10-26 RX ORDER — MELOXICAM 15 MG/1
TABLET ORAL DAILY
Refills: 3 | COMMUNITY
Start: 2018-10-13 | End: 2019-05-03

## 2018-10-26 NOTE — PROGRESS NOTES
"Subjective   Quan Holloway is a 63 y.o. male.     History of Present Illness     /60 (BP Location: Left arm, Patient Position: Sitting, Cuff Size: Adult)   Resp 14   Ht 172.7 cm (68\")   Wt 80.7 kg (178 lb)   BMI 27.06 kg/m²     Current Outpatient Prescriptions:   •  atorvastatin (LIPITOR) 10 MG tablet, Take 1 tablet by mouth Daily., Disp: 90 tablet, Rfl: 3  •  meloxicam (MOBIC) 15 MG tablet, Take  by mouth Daily., Disp: , Rfl: 3  •  tamsulosin (FLOMAX) 0.4 MG capsule 24 hr capsule, Take 1 capsule by mouth Every Night., Disp: 30 capsule, Rfl: 11    Patient has DM type II. Quan Hloloway uses diet alone for blood sugars control. Patient does not check home blood sugars.. Compliance with low carb diabetic diet is fair.  In a past control had been good. Last Hb A1C was 6.16.    Patient continue to suffer with right knee pains. He had meniscal tear and osteoarthritis in the right knee, and underwent arthroscopic surgery on 08/01/2018 - . Patient is on light duty, allowed to sit down every hour for 5 min. He is compliant with daily use of Meloxicam, takes regularly. States that standing makes the pain worse, and occasionally he turns the leg in a way, that the pain shoots throw the knee.    The following portions of the patient's history were reviewed and updated as appropriate: allergies, current medications, past family history, past medical history, past social history, past surgical history and problem list.    Review of Systems   Constitutional: Negative for chills and fever.   Eyes: Negative for pain and redness.   Respiratory: Negative for cough and shortness of breath.    Cardiovascular: Negative for chest pain and leg swelling.   Neurological: Negative for dizziness and headaches.       Objective   Physical Exam   Constitutional: He is oriented to person, place, and time. He appears well-developed and well-nourished.   HENT:   Head: Normocephalic and atraumatic.   Right Ear: Tympanic membrane, " external ear and ear canal normal.   Left Ear: Tympanic membrane, external ear and ear canal normal.   Nose: Nose normal. Right sinus exhibits no maxillary sinus tenderness and no frontal sinus tenderness. Left sinus exhibits no maxillary sinus tenderness and no frontal sinus tenderness.   Mouth/Throat: Uvula is midline, oropharynx is clear and moist and mucous membranes are normal.   Eyes: Pupils are equal, round, and reactive to light. Conjunctivae and EOM are normal. Right eye exhibits no discharge. Left eye exhibits no discharge. No scleral icterus.   lateral strobismus left eye   Neck: Neck supple. No JVD present.   Cardiovascular: Normal rate, regular rhythm and normal heart sounds.  Exam reveals no gallop and no friction rub.    No murmur heard.  Pulmonary/Chest: Effort normal and breath sounds normal. He has no wheezes. He has no rales.   Musculoskeletal: He exhibits no edema.   Lymphadenopathy:     He has no cervical adenopathy.   Neurological: He is alert and oriented to person, place, and time. No cranial nerve deficit.   Skin: Skin is warm and dry. No rash noted.   Psychiatric: He has a normal mood and affect. His behavior is normal.   Vitals reviewed.      Assessment/Plan   Quan was seen today for diabetes and knee pain.    Diagnoses and all orders for this visit:    Health care maintenance  -     CBC & Differential; Future  -     Comprehensive Metabolic Panel; Future  -     Lipid Panel; Future  -     Urinalysis With Microscopic If Indicated (No Culture) - Urine, Clean Catch; Future  -     TSH; Future  -     PSA Screen; Future        DM II - well controlled with diet alone. Hb A1C is   Lab Results   Component Value Date    HGBA1C 5.89 (H) 10/19/2018    .  No hypoglycemic episodes. Low carb diet and regular exercise recommended.     Knee pain - suboptimal response to arthroscopic surgery. Continue Meloxicam and follow up with  as needed.

## 2018-11-28 ENCOUNTER — OFFICE VISIT (OUTPATIENT)
Dept: INTERNAL MEDICINE | Facility: CLINIC | Age: 63
End: 2018-11-28

## 2018-11-28 ENCOUNTER — TELEPHONE (OUTPATIENT)
Dept: INTERNAL MEDICINE | Facility: CLINIC | Age: 63
End: 2018-11-28

## 2018-11-28 VITALS
DIASTOLIC BLOOD PRESSURE: 82 MMHG | SYSTOLIC BLOOD PRESSURE: 140 MMHG | OXYGEN SATURATION: 97 % | BODY MASS INDEX: 26.98 KG/M2 | HEIGHT: 68 IN | WEIGHT: 178 LBS | HEART RATE: 86 BPM

## 2018-11-28 DIAGNOSIS — M54.16 LUMBAR RADICULOPATHY: Primary | ICD-10-CM

## 2018-11-28 DIAGNOSIS — Z98.890 HISTORY OF LUMBAR DISCECTOMY: ICD-10-CM

## 2018-11-28 PROCEDURE — 99213 OFFICE O/P EST LOW 20 MIN: CPT | Performed by: NURSE PRACTITIONER

## 2018-11-28 NOTE — TELEPHONE ENCOUNTER
----- Message from Patrizia Woods sent at 11/28/2018 10:18 AM EST -----  Pt would like Dr Tinsley to call him to discuss disability paperwork.  Pt can be reached at [phone]

## 2018-11-28 NOTE — TELEPHONE ENCOUNTER
Please call family: I had done FMLE papers for several patients in a past, I had never (NEVER!) done disability papers in my life since I worked. I had asked Yaneth to get me info on that. She thinks that he needs to bring some paperwork, but not sure where to get the paperwork from.Usually specialists (neurosurgeon, oncologist, ortho, etc), like his ortho, deal with disability issues. We will let him know when have more info.

## 2018-11-29 NOTE — PROGRESS NOTES
Subjective   Quan Holloway is a 63 y.o. male who presents due to right lumbar pain radiating into right hip and leg.    He underwent a lumbar discectomy L3-4 on 18 by Dr. Reed and has overall done well. He is still on work restrictions which are due to  . He presents today due to increased pain in his right hip radiating into his right leg without recent injury or trauma. He states the pain began Thursday and has gradually worsened. Denies weakness in lower extremities. Pain is worse with standing.      Leg Pain    The pain is present in the right hip and right thigh. The quality of the pain is described as aching and burning. The pain is moderate. Associated symptoms include tingling. Pertinent negatives include no inability to bear weight, loss of sensation or muscle weakness. He has tried NSAIDs for the symptoms. The treatment provided mild relief.   Hip Pain    Associated symptoms include tingling. Pertinent negatives include no inability to bear weight, loss of sensation or muscle weakness.        The following portions of the patient's history were reviewed and updated as appropriate: allergies, current medications, past social history and problem list.    Past Medical History:   Diagnosis Date   • Blindness of left eye     since birth   • Osteoarthritis of spine with radiculopathy, cervical region 3/10/2016   • Right foot infection 2018    , s/p debridement         Current Outpatient Medications:   •  atorvastatin (LIPITOR) 10 MG tablet, Take 1 tablet by mouth Daily., Disp: 90 tablet, Rfl: 3  •  meloxicam (MOBIC) 15 MG tablet, Take  by mouth Daily., Disp: , Rfl: 3  •  tamsulosin (FLOMAX) 0.4 MG capsule 24 hr capsule, Take 1 capsule by mouth Every Night., Disp: 30 capsule, Rfl: 11    No Known Allergies    Review of Systems   Constitutional: Positive for activity change. Negative for appetite change, chills, fever and unexpected weight change.   HENT: Negative for ear pain, sinus  "pressure and sore throat.    Eyes: Negative for visual disturbance.   Respiratory: Negative for cough, shortness of breath and wheezing.    Cardiovascular: Negative for chest pain, palpitations and leg swelling.   Gastrointestinal: Negative for abdominal pain (no change in bowel function), blood in stool, constipation, diarrhea, nausea and vomiting.   Genitourinary: Negative for decreased urine volume, difficulty urinating (no change in bladder function), frequency, genital sores, hematuria and urgency.   Musculoskeletal: Positive for back pain and gait problem.   Skin: Negative for rash.   Neurological: Positive for tingling. Negative for dizziness, syncope, weakness, light-headedness and headaches.   Psychiatric/Behavioral: Negative for dysphoric mood.       Objective   Vitals:    11/28/18 0946   BP: 140/82   BP Location: Left arm   Patient Position: Sitting   Cuff Size: Adult   Pulse: 86   SpO2: 97%   Weight: 80.7 kg (178 lb)   Height: 172.7 cm (68\")     Physical Exam   Constitutional: He appears well-developed and well-nourished. He is cooperative. He does not have a sickly appearance. He does not appear ill.   HENT:   Head: Normocephalic.   Right Ear: Hearing, tympanic membrane and external ear normal.   Left Ear: Hearing, tympanic membrane and external ear normal.   Nose: Nose normal. No mucosal edema, rhinorrhea, sinus tenderness or nasal deformity. Right sinus exhibits no maxillary sinus tenderness and no frontal sinus tenderness. Left sinus exhibits no maxillary sinus tenderness and no frontal sinus tenderness.   Mouth/Throat: Oropharynx is clear and moist and mucous membranes are normal. Normal dentition.   Eyes: Conjunctivae and lids are normal. Pupils are equal, round, and reactive to light. Right eye exhibits no discharge and no exudate. Left eye exhibits no discharge and no exudate.   Neck: Trachea normal and normal range of motion. Carotid bruit is not present. No edema present. No thyroid mass and no " thyromegaly present.   Cardiovascular: Regular rhythm, normal heart sounds and normal pulses.   No murmur heard.  Pulmonary/Chest: Breath sounds normal. No respiratory distress. He has no decreased breath sounds. He has no wheezes. He has no rhonchi. He has no rales.   Musculoskeletal:        Right hip: He exhibits normal strength and no tenderness.        Lumbar back: He exhibits tenderness.   Mild tenderness with abduction and adduction of right leg   Lymphadenopathy:        Head (right side): No submental, no submandibular, no tonsillar, no preauricular, no posterior auricular and no occipital adenopathy present.        Head (left side): No submental, no submandibular, no tonsillar, no preauricular, no posterior auricular and no occipital adenopathy present.   Neurological: He is alert. He has normal strength. No sensory deficit.   Skin: Skin is warm, dry and intact. No cyanosis. Nails show no clubbing.       Assessment/Plan   Quan was seen today for leg pain and hip pain.    Diagnoses and all orders for this visit:    Lumbar radiculopathy    History of lumbar discectomy    Reviewed xray of the right hip from 12/2017 which showed mild degenerative changes. I believe sx are more radicular from the back rather than a hip issue. He states he is no longer doing the exercises issued by PT after his lumbar decompression which I have encouraged him to restart. Continue Meloxicam. I also recommend a f/u appt with Dr. Reed due to worsening pain.

## 2018-11-30 ENCOUNTER — TELEPHONE (OUTPATIENT)
Dept: INTERNAL MEDICINE | Facility: CLINIC | Age: 63
End: 2018-11-30

## 2018-12-10 ENCOUNTER — OFFICE VISIT (OUTPATIENT)
Dept: ORTHOPEDIC SURGERY | Facility: CLINIC | Age: 63
End: 2018-12-10

## 2018-12-10 VITALS — BODY MASS INDEX: 26.73 KG/M2 | HEIGHT: 68 IN | TEMPERATURE: 98 F | WEIGHT: 176.4 LBS

## 2018-12-10 DIAGNOSIS — M25.511 CHRONIC RIGHT SHOULDER PAIN: Primary | ICD-10-CM

## 2018-12-10 DIAGNOSIS — G89.29 CHRONIC RIGHT SHOULDER PAIN: Primary | ICD-10-CM

## 2018-12-10 PROCEDURE — 73030 X-RAY EXAM OF SHOULDER: CPT | Performed by: ORTHOPAEDIC SURGERY

## 2018-12-10 PROCEDURE — 99213 OFFICE O/P EST LOW 20 MIN: CPT | Performed by: ORTHOPAEDIC SURGERY

## 2018-12-10 NOTE — PROGRESS NOTES
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  His daughter is present for translation however his the patient's English is much better I think we communicate just fine.  His symptoms are mild to moderate intermittent mostly if over work towards doing a lot of overhead activity better with rest he is a  his past medical history is unremarkable      Allergies: No Known Allergies    Medications:   Home Medications:  Current Outpatient Medications on File Prior to Visit   Medication Sig   • meloxicam (MOBIC) 15 MG tablet Take  by mouth Daily.   • tamsulosin (FLOMAX) 0.4 MG capsule 24 hr capsule Take 1 capsule by mouth Every Night.   • atorvastatin (LIPITOR) 10 MG tablet Take 1 tablet by mouth Daily.     No current facility-administered medications on file prior to visit.      Current Medications:  Scheduled Meds:  Continuous Infusions:  No current facility-administered medications for this visit.   PRN Meds:.    Past Medical History:   Diagnosis Date   • Blindness of left eye     since birth   • Osteoarthritis of spine with radiculopathy, cervical region 3/10/2016   • Right foot infection 2018    , s/p debridement        Past Surgical History:   Procedure Laterality Date   • APPENDECTOMY  2002   • COLONOSCOPY W/ POLYPECTOMY  07/20/2018     3 polyps 3-5 mm   • LUMBAR DISCECTOMY  02/12/2018    , L3-4   • SHOULDER SURGERY Right    • SHOULDER SURGERY Left     Dr.Kitty Vann        Social History  "    Occupational History   • Not on file   Tobacco Use   • Smoking status: Current Every Day Smoker     Packs/day: 1.00     Years: 40.00     Pack years: 40.00     Types: Cigarettes   • Smokeless tobacco: Never Used   • Tobacco comment: Began smoking at age 12.  Quit smoking on 2 occasions in the past for a total of 10 years.  Smoked 1 ppd for a 40 pack year history.   Substance and Sexual Activity   • Alcohol use: Yes     Alcohol/week: 0.6 oz     Types: 1 Standard drinks or equivalent per week     Comment: 1 DRINK PER WEEK   • Drug use: No   • Sexual activity: Defer      Social History     Social History Narrative   • Not on file        Family History   Problem Relation Age of Onset   • Brain cancer Mother    • Prostate cancer Father    • Other Father         PAD   • Stroke Father              Review of Systems: 14 point review of systems are remarkable for the pertinent positives listed in the chart by the patient the remainder are negative    Review of Systems      Physical Exam: 63 y.o. male  General Appearance:    Alert, cooperative, in no acute distress                   Vitals:    12/10/18 1514   Temp: 98 °F (36.7 °C)   TempSrc: Temporal   Weight: 80 kg (176 lb 6.4 oz)   Height: 172.7 cm (68\")      Patient is alert and read ×3 no acute distress appears her above-listed at height weight and age.  Affect is normal respiratory rate is normal unlabored. Heart rate regular rate rhythm, sclera, dentition and hearing are normal for the purpose of this exam.    Ortho Exam Physical exam of the right shoulder reveals no overlying skin changes no lymphedema no lymphadenopathy.  Patient has active flexion 180 with mild symptoms abduction is similar external rotation is to 50 and internal rotation to the upper lumbar spine with mild symptoms.  Patient has good rotator cuff strength 4+ over 5 with isometric strength testing with pain.  Patient has a positive impingement and a positive Eduardo sign.  Patient has good " cervical range of motion which is full and asymptomatic no radicular symptoms.  Patient has a normal elbow exam.  Good distal pulses are present  Patient has pain with overhead activity and a positive Neer sign and a positive empty can sign , a positive drop arm and a definitive painful arc  Procedures          Radiology:   AP, Scapular Y and Axillary Lateral of the right shoulder were ordered/reviewed to evauate shoulder pain.  Have no comparative films readily available he has some mild acromioclavicular arthritis otherwise no acute bony pathology is seen  Imaging Results (most recent)     Procedure Component Value Units Date/Time    XR Shoulder 2+ View Right [[id number]] Resulted:  12/10/18 1516     Updated:  12/10/18 1516    Impression:       Ordering physician's impression is located in the Encounter Note dated 12/10/18. X-ray performed in the DR room.          Assessment/Plan: Right shoulder pain with a pretty good exam I think he will be fine if we can just maintain his permanent restrictions of no overhead lifting I will give him another letter stating this am happy to discuss with employer if needed

## 2019-03-10 DIAGNOSIS — N40.1 BENIGN PROSTATIC HYPERPLASIA (BPH) WITH STRAINING ON URINATION: ICD-10-CM

## 2019-03-10 DIAGNOSIS — R39.16 BENIGN PROSTATIC HYPERPLASIA (BPH) WITH STRAINING ON URINATION: ICD-10-CM

## 2019-03-11 RX ORDER — TAMSULOSIN HYDROCHLORIDE 0.4 MG/1
CAPSULE ORAL
Qty: 30 CAPSULE | Refills: 5 | Status: SHIPPED | OUTPATIENT
Start: 2019-03-11 | End: 2019-09-18 | Stop reason: SDUPTHER

## 2019-04-22 ENCOUNTER — OFFICE VISIT (OUTPATIENT)
Dept: ORTHOPEDIC SURGERY | Facility: CLINIC | Age: 64
End: 2019-04-22

## 2019-04-22 VITALS — HEIGHT: 69 IN | BODY MASS INDEX: 25.18 KG/M2 | TEMPERATURE: 98.8 F | WEIGHT: 170 LBS

## 2019-04-22 DIAGNOSIS — M25.511 CHRONIC PAIN OF BOTH SHOULDERS: Primary | ICD-10-CM

## 2019-04-22 DIAGNOSIS — G89.29 CHRONIC PAIN OF BOTH SHOULDERS: Primary | ICD-10-CM

## 2019-04-22 DIAGNOSIS — M25.512 CHRONIC PAIN OF BOTH SHOULDERS: Primary | ICD-10-CM

## 2019-04-22 PROCEDURE — 99212 OFFICE O/P EST SF 10 MIN: CPT | Performed by: ORTHOPAEDIC SURGERY

## 2019-04-22 NOTE — PROGRESS NOTES
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  His work told him that he can no longer work there they put him on short-term disability he would like me continue this disability for 6 months until he can get on long-term      Physical Exam: 63 y.o. male  General Appearance:    Alert, cooperative, in no acute distress                   Vitals:    04/22/19 1257   Temp: 98.8 °F (37.1 °C)   TempSrc: Temporal   Weight: 77.1 kg (170 lb)   Height: 175.3 cm (69\")        Patient is alert and read ×3 no acute distress appears her above-listed at height weight and age.  Affect is normal respiratory rate is normal unlabored. Heart rate regular rate rhythm, sclera, dentition and hearing are normal for the purpose of this exam.      Ortho Exam      Physical exam of the right and left shoulder reveals no overlying skin changes no lymphedema no lymphadenopathy.  Patient has active flexion 180 with mild symptoms abduction is similar external rotation is to 50 and internal rotation to the upper lumbar spine with mild symptoms.  Patient has good rotator cuff strength 4+ over 5 with isometric strength testing with pain.  Patient has a positive impingement and a positive Eduardo sign.  Patient has good cervical range of motion which is full and asymptomatic no radicular symptoms.  Patient has a normal elbow exam.  Good distal pulses are present  Patient has pain with overhead activity and a positive Neer sign and a positive empty can sign , a positive drop arm " and a definitive painful arc        Assessment/Plan: Bilateral shoulder pain he does have some limitations will probably have permanent restrictions told him I will status permanent restrictions and then he can apply for short-term/long-term I cannot keep him off for 6 months

## 2019-04-26 ENCOUNTER — LAB (OUTPATIENT)
Dept: INTERNAL MEDICINE | Facility: CLINIC | Age: 64
End: 2019-04-26

## 2019-04-26 DIAGNOSIS — Z00.00 HEALTH CARE MAINTENANCE: ICD-10-CM

## 2019-04-26 LAB
ALBUMIN SERPL-MCNC: 4 G/DL (ref 3.5–5.2)
ALBUMIN/GLOB SERPL: 1.3 G/DL
ALP SERPL-CCNC: 97 U/L (ref 39–117)
ALT SERPL W P-5'-P-CCNC: 19 U/L (ref 1–41)
ANION GAP SERPL CALCULATED.3IONS-SCNC: 12.6 MMOL/L
AST SERPL-CCNC: 15 U/L (ref 1–40)
BASOPHILS # BLD AUTO: 0.03 10*3/MM3 (ref 0–0.2)
BASOPHILS NFR BLD AUTO: 0.4 % (ref 0–1.5)
BILIRUB SERPL-MCNC: 0.3 MG/DL (ref 0.2–1.2)
BILIRUB UR QL STRIP: NEGATIVE
BUN BLD-MCNC: 15 MG/DL (ref 8–23)
BUN/CREAT SERPL: 14.2 (ref 7–25)
CALCIUM SPEC-SCNC: 9.4 MG/DL (ref 8.6–10.5)
CHLORIDE SERPL-SCNC: 104 MMOL/L (ref 98–107)
CHOLEST SERPL-MCNC: 237 MG/DL (ref 0–200)
CLARITY UR: CLEAR
CO2 SERPL-SCNC: 23.4 MMOL/L (ref 22–29)
COLOR UR: YELLOW
CREAT BLD-MCNC: 1.06 MG/DL (ref 0.76–1.27)
DEPRECATED RDW RBC AUTO: 41.1 FL (ref 37–54)
EOSINOPHIL # BLD AUTO: 0.46 10*3/MM3 (ref 0–0.4)
EOSINOPHIL NFR BLD AUTO: 6 % (ref 0.3–6.2)
ERYTHROCYTE [DISTWIDTH] IN BLOOD BY AUTOMATED COUNT: 12.5 % (ref 12.3–15.4)
GFR SERPL CREATININE-BSD FRML MDRD: 71 ML/MIN/1.73
GLOBULIN UR ELPH-MCNC: 3 GM/DL
GLUCOSE BLD-MCNC: 121 MG/DL (ref 65–99)
GLUCOSE UR STRIP-MCNC: NEGATIVE MG/DL
HCT VFR BLD AUTO: 46.4 % (ref 37.5–51)
HDLC SERPL-MCNC: 46 MG/DL (ref 40–60)
HGB BLD-MCNC: 16.1 G/DL (ref 13–17.7)
HGB UR QL STRIP.AUTO: NEGATIVE
KETONES UR QL STRIP: NEGATIVE
LDLC SERPL CALC-MCNC: 158 MG/DL (ref 0–100)
LDLC/HDLC SERPL: 3.44 {RATIO}
LEUKOCYTE ESTERASE UR QL STRIP.AUTO: NEGATIVE
LYMPHOCYTES # BLD AUTO: 2.57 10*3/MM3 (ref 0.7–3.1)
LYMPHOCYTES NFR BLD AUTO: 33.2 % (ref 19.6–45.3)
MCH RBC QN AUTO: 31.8 PG (ref 26.6–33)
MCHC RBC AUTO-ENTMCNC: 34.7 G/DL (ref 31.5–35.7)
MCV RBC AUTO: 91.5 FL (ref 79–97)
MONOCYTES # BLD AUTO: 0.56 10*3/MM3 (ref 0.1–0.9)
MONOCYTES NFR BLD AUTO: 7.2 % (ref 5–12)
NEUTROPHILS # BLD AUTO: 4.11 10*3/MM3 (ref 1.7–7)
NEUTROPHILS NFR BLD AUTO: 53.2 % (ref 42.7–76)
NITRITE UR QL STRIP: NEGATIVE
PH UR STRIP.AUTO: 5.5 [PH] (ref 5–8)
PLATELET # BLD AUTO: 218 10*3/MM3 (ref 140–450)
PMV BLD AUTO: 11.2 FL (ref 6–12)
POTASSIUM BLD-SCNC: 4.1 MMOL/L (ref 3.5–5.2)
PROT SERPL-MCNC: 7 G/DL (ref 6–8.5)
PROT UR QL STRIP: NEGATIVE
PSA SERPL-MCNC: 3.41 NG/ML (ref 0–4)
RBC # BLD AUTO: 5.07 10*6/MM3 (ref 4.14–5.8)
SODIUM BLD-SCNC: 140 MMOL/L (ref 136–145)
SP GR UR STRIP: >=1.03 (ref 1–1.03)
TRIGL SERPL-MCNC: 163 MG/DL (ref 0–150)
TSH SERPL DL<=0.05 MIU/L-ACNC: 3.74 MIU/ML (ref 0.27–4.2)
UROBILINOGEN UR QL STRIP: NORMAL
VLDLC SERPL-MCNC: 32.6 MG/DL (ref 5–40)
WBC NRBC COR # BLD: 7.73 10*3/MM3 (ref 3.4–10.8)

## 2019-04-26 PROCEDURE — 80053 COMPREHEN METABOLIC PANEL: CPT | Performed by: INTERNAL MEDICINE

## 2019-04-26 PROCEDURE — 80061 LIPID PANEL: CPT | Performed by: INTERNAL MEDICINE

## 2019-04-26 PROCEDURE — 81003 URINALYSIS AUTO W/O SCOPE: CPT | Performed by: INTERNAL MEDICINE

## 2019-04-26 PROCEDURE — 84443 ASSAY THYROID STIM HORMONE: CPT | Performed by: INTERNAL MEDICINE

## 2019-04-26 PROCEDURE — 36415 COLL VENOUS BLD VENIPUNCTURE: CPT | Performed by: INTERNAL MEDICINE

## 2019-04-26 PROCEDURE — 85025 COMPLETE CBC W/AUTO DIFF WBC: CPT | Performed by: INTERNAL MEDICINE

## 2019-04-26 PROCEDURE — G0103 PSA SCREENING: HCPCS | Performed by: INTERNAL MEDICINE

## 2019-05-03 ENCOUNTER — OFFICE VISIT (OUTPATIENT)
Dept: INTERNAL MEDICINE | Facility: CLINIC | Age: 64
End: 2019-05-03

## 2019-05-03 ENCOUNTER — HOSPITAL ENCOUNTER (OUTPATIENT)
Dept: CT IMAGING | Facility: HOSPITAL | Age: 64
Discharge: HOME | End: 2019-05-03
Admitting: INTERNAL MEDICINE

## 2019-05-03 VITALS
HEIGHT: 69 IN | SYSTOLIC BLOOD PRESSURE: 128 MMHG | DIASTOLIC BLOOD PRESSURE: 78 MMHG | BODY MASS INDEX: 26.22 KG/M2 | RESPIRATION RATE: 14 BRPM | WEIGHT: 177 LBS

## 2019-05-03 DIAGNOSIS — Z12.2 ENCOUNTER FOR SCREENING FOR LUNG CANCER: ICD-10-CM

## 2019-05-03 DIAGNOSIS — R10.32 LEFT LOWER QUADRANT PAIN: Primary | ICD-10-CM

## 2019-05-03 DIAGNOSIS — Z00.00 HEALTH CARE MAINTENANCE: Primary | ICD-10-CM

## 2019-05-03 DIAGNOSIS — E11.9 DIABETES MELLITUS TYPE 2, DIET-CONTROLLED (HCC): ICD-10-CM

## 2019-05-03 DIAGNOSIS — R10.32 LEFT LOWER QUADRANT PAIN: ICD-10-CM

## 2019-05-03 PROBLEM — H81.23 VESTIBULAR NEURONITIS OF BOTH EARS: Status: RESOLVED | Noted: 2018-06-20 | Resolved: 2019-05-03

## 2019-05-03 PROBLEM — S83.206D ACUTE MENISCAL TEAR OF KNEE, RIGHT, SUBSEQUENT ENCOUNTER: Status: RESOLVED | Noted: 2018-05-22 | Resolved: 2019-05-03

## 2019-05-03 PROBLEM — M51.26 HERNIATED NUCLEUS PULPOSUS, L3-4 RIGHT: Status: RESOLVED | Noted: 2018-01-17 | Resolved: 2019-05-03

## 2019-05-03 LAB
ALBUMIN UR-MCNC: <1.2 MG/L
BASOPHILS # BLD AUTO: 0.03 10*3/MM3 (ref 0–0.2)
BASOPHILS NFR BLD AUTO: 0.3 % (ref 0–1.5)
BILIRUB UR QL STRIP: NEGATIVE
CLARITY UR: CLEAR
COLOR UR: YELLOW
CREAT BLDA-MCNC: 0.9 MG/DL (ref 0.6–1.3)
CREAT UR-MCNC: 209 MG/DL
DEPRECATED RDW RBC AUTO: 41.2 FL (ref 37–54)
EOSINOPHIL # BLD AUTO: 0.32 10*3/MM3 (ref 0–0.4)
EOSINOPHIL NFR BLD AUTO: 3.2 % (ref 0.3–6.2)
ERYTHROCYTE [DISTWIDTH] IN BLOOD BY AUTOMATED COUNT: 12.5 % (ref 12.3–15.4)
GLUCOSE UR STRIP-MCNC: ABNORMAL MG/DL
HBA1C MFR BLD: 6 % (ref 4.8–5.6)
HCT VFR BLD AUTO: 49.6 % (ref 37.5–51)
HGB BLD-MCNC: 17.2 G/DL (ref 13–17.7)
HGB UR QL STRIP.AUTO: NEGATIVE
KETONES UR QL STRIP: NEGATIVE
LEUKOCYTE ESTERASE UR QL STRIP.AUTO: NEGATIVE
LYMPHOCYTES # BLD AUTO: 2.13 10*3/MM3 (ref 0.7–3.1)
LYMPHOCYTES NFR BLD AUTO: 21.6 % (ref 19.6–45.3)
MCH RBC QN AUTO: 31.9 PG (ref 26.6–33)
MCHC RBC AUTO-ENTMCNC: 34.7 G/DL (ref 31.5–35.7)
MCV RBC AUTO: 91.9 FL (ref 79–97)
MICROALBUMIN/CREAT UR: NORMAL MG/G
MONOCYTES # BLD AUTO: 0.59 10*3/MM3 (ref 0.1–0.9)
MONOCYTES NFR BLD AUTO: 6 % (ref 5–12)
NEUTROPHILS # BLD AUTO: 6.8 10*3/MM3 (ref 1.7–7)
NEUTROPHILS NFR BLD AUTO: 68.9 % (ref 42.7–76)
NITRITE UR QL STRIP: NEGATIVE
PH UR STRIP.AUTO: 5.5 [PH] (ref 5–8)
PLATELET # BLD AUTO: 219 10*3/MM3 (ref 140–450)
PMV BLD AUTO: 10.9 FL (ref 6–12)
PROT UR QL STRIP: NEGATIVE
RBC # BLD AUTO: 5.4 10*6/MM3 (ref 4.14–5.8)
SP GR UR STRIP: >=1.03 (ref 1–1.03)
UROBILINOGEN UR QL STRIP: ABNORMAL
WBC NRBC COR # BLD: 9.87 10*3/MM3 (ref 3.4–10.8)

## 2019-05-03 PROCEDURE — 82565 ASSAY OF CREATININE: CPT

## 2019-05-03 PROCEDURE — 25010000002 IOPAMIDOL 61 % SOLUTION: Performed by: INTERNAL MEDICINE

## 2019-05-03 PROCEDURE — 85025 COMPLETE CBC W/AUTO DIFF WBC: CPT | Performed by: INTERNAL MEDICINE

## 2019-05-03 PROCEDURE — 81003 URINALYSIS AUTO W/O SCOPE: CPT | Performed by: INTERNAL MEDICINE

## 2019-05-03 PROCEDURE — 99396 PREV VISIT EST AGE 40-64: CPT | Performed by: INTERNAL MEDICINE

## 2019-05-03 PROCEDURE — 36415 COLL VENOUS BLD VENIPUNCTURE: CPT | Performed by: INTERNAL MEDICINE

## 2019-05-03 PROCEDURE — G0297 LDCT FOR LUNG CA SCREEN: HCPCS

## 2019-05-03 PROCEDURE — 82043 UR ALBUMIN QUANTITATIVE: CPT | Performed by: INTERNAL MEDICINE

## 2019-05-03 PROCEDURE — 83036 HEMOGLOBIN GLYCOSYLATED A1C: CPT | Performed by: INTERNAL MEDICINE

## 2019-05-03 PROCEDURE — 74177 CT ABD & PELVIS W/CONTRAST: CPT

## 2019-05-03 PROCEDURE — 82570 ASSAY OF URINE CREATININE: CPT | Performed by: INTERNAL MEDICINE

## 2019-05-03 RX ORDER — LEVOFLOXACIN 500 MG/1
500 TABLET, FILM COATED ORAL DAILY
Qty: 7 TABLET | Refills: 0 | Status: SHIPPED | OUTPATIENT
Start: 2019-05-03 | End: 2019-05-10

## 2019-05-03 RX ADMIN — IOPAMIDOL 85 ML: 612 INJECTION, SOLUTION INTRAVENOUS at 12:45

## 2019-05-03 NOTE — PATIENT INSTRUCTIONS
Risk evaluation:  1. Cardiovascular risk factors: hyperlipidemia, diabetes mellitus, tobacco abuse.  2. Diabetes risk factors: had been diagnosed with diabetes..   3. Cancer risk factors: FH of colon polyps.  4. Risky behavior: none. Use of seat belts: regular. Use of sunscreens: none. Tattoos: one.   H/o blood transfusion/organ transplant before 1992 or clotting factors transfusion before 1987: none.    Prevention:   Cholesterol test up to date. Cholesterol is high. patient had discontinued Atorvastatin on hios own due to myalgias..  Blood sugar test up to date. Fasting blood sugar is high. Recommended to avoid sweets and starches: pasta, pizza, bread, potato, corn. .  Hep C testing (for those born [id number]): completed..  Colonoscopy up to date. Recommended every 5 years. Next screening is recommended in 2023..  Prostate cancer screening and prevention: normal PSA.

## 2019-05-03 NOTE — NURSING NOTE
Results are no diverticulitis, nothing acute, negative per Dr. Esquivel.  .  Results called to Dr. Tinsley.  Dr. Tinsley spoke with the patient and he was D/Manoj.  IV D/Manoj and site dressed with gauze and Co Flex.  Ambulated to lobby with wife.

## 2019-05-03 NOTE — PROGRESS NOTES
"Subjective   Quan Holloway is a 63 y.o. male.     History of Present Illness     Quan Holloway 63 y.o. male who presents today complaining of abdominal pain.   Pain is located  in the LLQ area.  S-ms started 2 days ago ago. Describes pain as burning and sharp. Patient has had no nausea, vomiting, constipation, diarrhea, change in the bowel movement and blood in stool.The pain gets worse with deep breath. No back pain.   PMH is significant for colonoscopy with polyp(s) on 07/20/2018.      /78 (BP Location: Left arm, Patient Position: Sitting, Cuff Size: Adult)   Resp 14   Ht 175.3 cm (69\")   Wt 80.3 kg (177 lb)   BMI 26.14 kg/m²   Patient is here for yearly CPE. Last CPE was 1 year ago.  PMH, SH and FH reviewed. Changes in the FH: none.  Patient rates his health as good.  Tobacco use: current smoker.  Alcohol use: occasionally.  Recreational drugs use: none.  Medication list rewieved.  Diet: regular.  Patient exercises  none.  Marital status: .  Employment: full time.  Patient rates his stress level as low.  Dental health: good. Brushes teeth 2 times a day, flosses once a day. Dental visits 2 times a year.  Vision correction: reading glasses.  Hearing: normal.    Recent vaccinations:   flu  discussed and recommended, but patient declines  Tdap discussed and recommended, but patient declines  Shingles prevention discussed and recommended, but patient declines vaccination.  Pneumonia vaccination discussed, patient declines.    Current Outpatient Medications:   •  tamsulosin (FLOMAX) 0.4 MG capsule 24 hr capsule, TAKE 1 CAPSULE BY MOUTH AT BEDTIME, Disp: 30 capsule, Rfl: 5  •  atorvastatin (LIPITOR) 10 MG tablet, Take 1 tablet by mouth Daily., Disp: 90 tablet, Rfl: 3  •  meloxicam (MOBIC) 15 MG tablet, Take  by mouth Daily., Disp: , Rfl: 3                            Current Outpatient Medications:   •  tamsulosin (FLOMAX) 0.4 MG capsule 24 hr capsule, TAKE 1 CAPSULE BY MOUTH AT BEDTIME, Disp: 30 " capsule, Rfl: 5  •  atorvastatin (LIPITOR) 10 MG tablet, Take 1 tablet by mouth Daily., Disp: 90 tablet, Rfl: 3  •  meloxicam (MOBIC) 15 MG tablet, Take  by mouth Daily., Disp: , Rfl: 3     The following portions of the patient's history were reviewed and updated as appropriate: allergies, current medications, past family history, past medical history, past social history, past surgical history and problem list.    Review of Systems   Constitutional: Negative for chills and fever.   HENT: Negative for postnasal drip, sinus pressure and sore throat.    Eyes: Negative for pain and itching.   Respiratory: Negative for cough and chest tightness.    Cardiovascular: Negative for chest pain and leg swelling.   Gastrointestinal: Positive for abdominal pain (left lower abdominal pain ). Negative for blood in stool.   Endocrine: Negative for cold intolerance and heat intolerance.   Genitourinary: Negative for difficulty urinating and flank pain.   Musculoskeletal: Negative for back pain and neck pain.   Skin: Negative for color change and rash.   Neurological: Negative for dizziness, weakness and headaches.   Hematological: Negative for adenopathy. Does not bruise/bleed easily.   Psychiatric/Behavioral: Positive for sleep disturbance. The patient is not nervous/anxious.        Objective   Physical Exam   Constitutional: He is oriented to person, place, and time. Vital signs are normal. He appears well-developed and well-nourished. No distress.   HENT:   Head: Normocephalic and atraumatic.   Right Ear: External ear normal.   Left Ear: External ear normal.   Nose: Nose normal. No mucosal edema. Right sinus exhibits no maxillary sinus tenderness and no frontal sinus tenderness. Left sinus exhibits no maxillary sinus tenderness and no frontal sinus tenderness.   Mouth/Throat: Oropharynx is clear and moist. No oropharyngeal exudate.   Eyes: Conjunctivae and lids are normal. Right eye exhibits no discharge. Left eye exhibits no  discharge. Right conjunctiva is not injected. Left conjunctiva is not injected. No scleral icterus. Right eye exhibits normal extraocular motion. Left eye exhibits abnormal extraocular motion. Left pupil is not reactive.   Neck: Normal range of motion and full passive range of motion without pain. Neck supple. No JVD present. Carotid bruit is not present. No thyromegaly present.   Cardiovascular: Normal rate, regular rhythm, S1 normal, S2 normal, normal heart sounds and intact distal pulses. PMI is not displaced. Exam reveals no gallop and no friction rub.   No murmur heard.  Pulmonary/Chest: Effort normal and breath sounds normal. No accessory muscle usage. No respiratory distress. He has no decreased breath sounds. He has no wheezes. He has no rhonchi. He has no rales.   Abdominal: Soft. Bowel sounds are normal. He exhibits no distension, no pulsatile liver, no fluid wave, no abdominal bruit, no ascites and no mass. There is tenderness (LLQ). There is guarding (on palpation of the lower abdomen, especially left side). There is no rebound.   Musculoskeletal: He exhibits no edema, tenderness (no paravertebral tenderness, n egative straight leg rising test oon the left side) or deformity.   Lymphadenopathy:        Head (right side): No submental, no submandibular, no preauricular, no posterior auricular and no occipital adenopathy present.        Head (left side): No submental, no submandibular, no tonsillar, no preauricular, no posterior auricular and no occipital adenopathy present.     He has no cervical adenopathy.        Right cervical: No superficial cervical, no deep cervical and no posterior cervical adenopathy present.       Left cervical: No superficial cervical, no deep cervical and no posterior cervical adenopathy present.        Right: No supraclavicular adenopathy present.        Left: No supraclavicular adenopathy present.   Neurological: He is alert and oriented to person, place, and time. He has  normal strength and normal reflexes. He displays normal reflexes. No cranial nerve deficit. He exhibits normal muscle tone. Coordination normal.   Reflex Scores:       Bicep reflexes are 2+ on the right side and 2+ on the left side.       Patellar reflexes are 2+ on the right side and 2+ on the left side.  Skin: Skin is warm and dry. No rash noted. He is not diaphoretic. No erythema.   Psychiatric: He has a normal mood and affect. His speech is normal and behavior is normal. Thought content normal.   Vitals reviewed.      Assessment/Plan   Quan was seen today for annual exam and abdominal pain.    Diagnoses and all orders for this visit:    Health care maintenance    Diabetes mellitus type 2, diet-controlled (CMS/HCC)  -     Hemoglobin A1c  -     Microalbumin / Creatinine Urine Ratio - Urine, Clean Catch    Encounter for screening for lung cancer  -     CT Chest Low Dose Wo; Future      Risk evaluation:  1. Cardiovascular risk factors: hyperlipidemia, diabetes mellitus, tobacco abuse.  2. Diabetes risk factors: had been diagnosed with diabetes..   3. Cancer risk factors: FH of colon polyps.  4. Risky behavior: none. Use of seat belts: regular. Use of sunscreens: none. Tattoos: one.   H/o blood transfusion/organ transplant before 1992 or clotting factors transfusion before 1987: none.    Prevention:   Cholesterol test up to date. Cholesterol is high. patient had discontinued Atorvastatin on hios own due to myalgias..  Blood sugar test up to date. Fasting blood sugar is high. Recommended to avoid sweets and starches: pasta, pizza, bread, potato, corn. .  Hep C testing (for those born [id number]): completed..  Colonoscopy up to date. Recommended every 5 years. Next screening is recommended in 2023..  Prostate cancer screening and prevention: normal PSA.    New onset abdominal pain - rather unusual presentation. The pain seems to have some positional component and to be aggravated by deep breathing, patient with several  "vascular risk factors (diabetes, hyperlipidemia, tobacco user) -  Will check CBC and UA. Will refer for the CT scan to r/o diverticulitis.  Hyperlipidemia - uncontrolled, as patient had discontinued statin. Normal liver function tests. LDL target is below < 70 mg/dl (\"very high\" risk for CHD). Patient's 158. Recommended to try different statin, but patient declines medication.  DM II - on diet only. Will check Hb A1C. Low carb diet and regular exercise recommended.   PAD. Still smokes. Risks explained. Recommended to quit.  Tobacco abuse. Cardiovascular risks and cancers linked to tobacco smoking discussed with patient.  I discussed with the patient several smoking cessation  strategies, from using the 8-800-QUIT NOW hotline phone number, to nicotine patches/gum/lozenges.  Patient is not ready to quit at that time. I advised Quan of the risks of continuing to use tobacco, and I provided him with tobacco cessation educational materials in the After Visit Summary.     During this visit, I spent 5 minutes counseling the patient regarding tobacco cessation. Ready to quit: No  Counseling given: Yes  Comment: Began smoking at age 12.  Quit smoking on 2 occasions in the past for a total of 10 years.  Smoked 1 ppd for a 40 pack year history.      "

## 2019-05-10 ENCOUNTER — OFFICE VISIT (OUTPATIENT)
Dept: INTERNAL MEDICINE | Facility: CLINIC | Age: 64
End: 2019-05-10

## 2019-05-10 VITALS
WEIGHT: 177 LBS | DIASTOLIC BLOOD PRESSURE: 78 MMHG | SYSTOLIC BLOOD PRESSURE: 130 MMHG | BODY MASS INDEX: 26.22 KG/M2 | HEIGHT: 69 IN | RESPIRATION RATE: 14 BRPM

## 2019-05-10 DIAGNOSIS — K57.92 DIVERTICULITIS: Primary | ICD-10-CM

## 2019-05-10 PROCEDURE — 99213 OFFICE O/P EST LOW 20 MIN: CPT | Performed by: INTERNAL MEDICINE

## 2019-05-10 NOTE — PROGRESS NOTES
"Subjective   Quan Holloway is a 63 y.o. male.     History of Present Illness   Quan Holloway 63 y.o. male presents today for diverticulitis f/u. Last was seen on 05/03/2019 and on that visit medication was changed due to insurance coverage change.We had started levofloxacin.  Patient is compliant with treatment and denies  side effects. Patient reports resolution of the symptoms.    /78 (BP Location: Left arm, Patient Position: Sitting, Cuff Size: Adult)   Resp 14   Ht 175.3 cm (69\")   Wt 80.3 kg (177 lb)   BMI 26.14 kg/m²     Current Outpatient Medications:   •  tamsulosin (FLOMAX) 0.4 MG capsule 24 hr capsule, TAKE 1 CAPSULE BY MOUTH AT BEDTIME, Disp: 30 capsule, Rfl: 5  The following portions of the patient's history were reviewed and updated as appropriate: allergies, current medications, past family history, past medical history, past social history, past surgical history and problem list.    Review of Systems   Constitutional: Negative for chills and fever.   Eyes: Negative for pain and redness.   Respiratory: Negative for cough and shortness of breath.    Cardiovascular: Negative for chest pain and leg swelling.   Neurological: Negative for dizziness and headaches.       Objective   Physical Exam   Constitutional: He is oriented to person, place, and time. He appears well-developed and well-nourished.   HENT:   Head: Normocephalic and atraumatic.   Right Ear: Tympanic membrane, external ear and ear canal normal.   Left Ear: Tympanic membrane, external ear and ear canal normal.   Nose: Nose normal. Right sinus exhibits no maxillary sinus tenderness and no frontal sinus tenderness. Left sinus exhibits no maxillary sinus tenderness and no frontal sinus tenderness.   Mouth/Throat: Uvula is midline, oropharynx is clear and moist and mucous membranes are normal.   Eyes: Conjunctivae and EOM are normal. Pupils are equal, round, and reactive to light. Right eye exhibits no discharge. Left eye exhibits no " discharge. No scleral icterus.   Neck: Neck supple. No JVD present.   Cardiovascular: Normal rate, regular rhythm and normal heart sounds. Exam reveals no gallop and no friction rub.   No murmur heard.  Pulmonary/Chest: Effort normal and breath sounds normal. He has no wheezes. He has no rales.   Abdominal: Soft. He exhibits no distension and no mass. There is no tenderness. There is no rebound and no guarding. No hernia.   Musculoskeletal: He exhibits no edema.   Lymphadenopathy:     He has no cervical adenopathy.   Neurological: He is alert and oriented to person, place, and time. No cranial nerve deficit.   Skin: Skin is warm and dry. No rash noted.   Psychiatric: He has a normal mood and affect. His behavior is normal.   Vitals reviewed.      Assessment/Plan   Quan was seen today for abdominal pain.    Diagnoses and all orders for this visit:    Diverticulitis    Diverticulitis - improved with treatment.

## 2019-09-18 DIAGNOSIS — N40.1 BENIGN PROSTATIC HYPERPLASIA (BPH) WITH STRAINING ON URINATION: ICD-10-CM

## 2019-09-18 DIAGNOSIS — R39.16 BENIGN PROSTATIC HYPERPLASIA (BPH) WITH STRAINING ON URINATION: ICD-10-CM

## 2019-09-18 RX ORDER — TAMSULOSIN HYDROCHLORIDE 0.4 MG/1
1 CAPSULE ORAL
Qty: 30 CAPSULE | Refills: 5 | Status: SHIPPED | OUTPATIENT
Start: 2019-09-18 | End: 2020-04-13

## 2020-04-12 DIAGNOSIS — N40.1 BENIGN PROSTATIC HYPERPLASIA (BPH) WITH STRAINING ON URINATION: ICD-10-CM

## 2020-04-12 DIAGNOSIS — R39.16 BENIGN PROSTATIC HYPERPLASIA (BPH) WITH STRAINING ON URINATION: ICD-10-CM

## 2020-04-13 RX ORDER — TAMSULOSIN HYDROCHLORIDE 0.4 MG/1
1 CAPSULE ORAL
Qty: 30 CAPSULE | Refills: 5 | Status: SHIPPED | OUTPATIENT
Start: 2020-04-13 | End: 2020-08-14 | Stop reason: SDUPTHER

## 2020-05-31 NOTE — TELEPHONE ENCOUNTER
Quan Holloway called complaining of worsening of tghe pain in the right hip. Symptoms started few days ago and are getting worse.   PMH significant for L spine DDD and mild hip OA per x-ray done 12/2017.    Impression: lumbar radiculopathy.  Plan: I hadd reviewed acute clinic visit note  - was seen by Stacey Gloria earlier this week. I had offered PT - patient declined. I had offered steroids - patient declines. Advised to continue Meloxicam and use heat and OTC Salonpas patches on his lower back. He also c/o having some leg cramps, that had scared him a lot. Advised to hydrate better.   Patient has OV with  in January., I had advised him to ask about cancellation list and to call ortho office and check if any appointments will open up sooner.   0 = swallows foods/liquids without difficulty

## 2020-08-14 ENCOUNTER — OFFICE VISIT (OUTPATIENT)
Dept: INTERNAL MEDICINE | Facility: CLINIC | Age: 65
End: 2020-08-14

## 2020-08-14 VITALS
HEIGHT: 67 IN | SYSTOLIC BLOOD PRESSURE: 114 MMHG | DIASTOLIC BLOOD PRESSURE: 62 MMHG | WEIGHT: 172 LBS | OXYGEN SATURATION: 99 % | HEART RATE: 68 BPM | BODY MASS INDEX: 27 KG/M2

## 2020-08-14 DIAGNOSIS — F17.200 TOBACCO USE DISORDER: ICD-10-CM

## 2020-08-14 DIAGNOSIS — R39.16 BENIGN PROSTATIC HYPERPLASIA (BPH) WITH STRAINING ON URINATION: ICD-10-CM

## 2020-08-14 DIAGNOSIS — Z00.00 HEALTH CARE MAINTENANCE: ICD-10-CM

## 2020-08-14 DIAGNOSIS — M72.0 DUPUYTREN'S CONTRACTURE: ICD-10-CM

## 2020-08-14 DIAGNOSIS — Z12.5 SPECIAL SCREENING FOR MALIGNANT NEOPLASM OF PROSTATE: ICD-10-CM

## 2020-08-14 DIAGNOSIS — N40.1 BENIGN PROSTATIC HYPERPLASIA (BPH) WITH STRAINING ON URINATION: ICD-10-CM

## 2020-08-14 DIAGNOSIS — E11.9 DIABETES MELLITUS TYPE 2, DIET-CONTROLLED (HCC): Primary | ICD-10-CM

## 2020-08-14 LAB
ALBUMIN SERPL-MCNC: 4.7 G/DL (ref 3.5–5.2)
ALBUMIN/GLOB SERPL: 2.1 G/DL
ALP SERPL-CCNC: 105 U/L (ref 39–117)
ALT SERPL-CCNC: 17 U/L (ref 1–41)
AST SERPL-CCNC: 10 U/L (ref 1–40)
BILIRUB SERPL-MCNC: 0.4 MG/DL (ref 0–1.2)
BUN SERPL-MCNC: 15 MG/DL (ref 8–23)
BUN/CREAT SERPL: 15.6 (ref 7–25)
CALCIUM SERPL-MCNC: 9.1 MG/DL (ref 8.6–10.5)
CHLORIDE SERPL-SCNC: 99 MMOL/L (ref 98–107)
CHOLEST SERPL-MCNC: 254 MG/DL (ref 0–200)
CO2 SERPL-SCNC: 28.6 MMOL/L (ref 22–29)
CREAT SERPL-MCNC: 0.96 MG/DL (ref 0.76–1.27)
ERYTHROCYTE [DISTWIDTH] IN BLOOD BY AUTOMATED COUNT: 12.7 % (ref 12.3–15.4)
GLOBULIN SER CALC-MCNC: 2.2 GM/DL
GLUCOSE SERPL-MCNC: 136 MG/DL (ref 65–99)
HBA1C MFR BLD: 6.2 % (ref 4.8–5.6)
HCT VFR BLD AUTO: 47.2 % (ref 37.5–51)
HDLC SERPL-MCNC: 56 MG/DL (ref 40–60)
HGB BLD-MCNC: 16.3 G/DL (ref 13–17.7)
LDLC SERPL CALC-MCNC: 171 MG/DL (ref 0–100)
LDLC/HDLC SERPL: 3.06 {RATIO}
MCH RBC QN AUTO: 32.1 PG (ref 26.6–33)
MCHC RBC AUTO-ENTMCNC: 34.5 G/DL (ref 31.5–35.7)
MCV RBC AUTO: 92.9 FL (ref 79–97)
PLATELET # BLD AUTO: 219 10*3/MM3 (ref 140–450)
POTASSIUM SERPL-SCNC: 4.1 MMOL/L (ref 3.5–5.2)
PROT SERPL-MCNC: 6.9 G/DL (ref 6–8.5)
PSA SERPL-MCNC: 3.44 NG/ML (ref 0–4)
RBC # BLD AUTO: 5.08 10*6/MM3 (ref 4.14–5.8)
SODIUM SERPL-SCNC: 136 MMOL/L (ref 136–145)
TRIGL SERPL-MCNC: 134 MG/DL (ref 0–150)
VLDLC SERPL CALC-MCNC: 26.8 MG/DL
WBC # BLD AUTO: 6.93 10*3/MM3 (ref 3.4–10.8)

## 2020-08-14 PROCEDURE — 99396 PREV VISIT EST AGE 40-64: CPT | Performed by: NURSE PRACTITIONER

## 2020-08-14 RX ORDER — TAMSULOSIN HYDROCHLORIDE 0.4 MG/1
1 CAPSULE ORAL
Qty: 30 CAPSULE | Refills: 5 | Status: SHIPPED | OUTPATIENT
Start: 2020-08-14 | End: 2021-02-07

## 2020-08-14 NOTE — PROGRESS NOTES
"           Name: Quan Holloway  :  1955    Subjective:      Chief Complaint   Patient presents with   • Establish Care   • Hyperlipidemia   • Diabetes        Quan Holloway is a 65 y.o. male prior patient of JIM Tinsley who is no longer at this practice and he is here to establish care with me.  He has multiple chronic medical conditions including: BPH, DM (diet controlled), Current smoker     Disabled due to shoulder and back.     He is new to me.   He was last seen by Dr Tinsley on 5/10/19.   He had had diverticulitis at that time and was treated with levaquin. Symptoms resolved.     HLD: He was on lipitor 10 mg in the past. No complaint of chest pain or myalgia.   BPH: States this is a chronic problem. States when he forgets his rx, he can tell it is more difficult to start stream.    PreDM: diet controlled, no sx     Quan is here for coordination of medical care, to discuss health maintenance, disease prevention as well as to followup on medical problems.     He states that his activity level is moderate. Exercises 2 per week. his diet is in general, a \"healthy\" diet  . Appetite is good.   Weight trend is   Wt Readings from Last 4 Encounters:   20 78 kg (172 lb)   05/10/19 80.3 kg (177 lb)   19 80.3 kg (177 lb)   19 77.1 kg (170 lb)       Feels fairly well with few complaints. Energy level is fair. Sleeps fairly well.  He describes his alcohol intake as social drinker    Depression screen: PHQ-2 Total Score: 0      Health Maintenance Male:    · He voids without difficulty when taking rx.       PSA was reviewed He has increased risk of prostate cancer. (family hx)     He has no change in bowel habits.   Patient's last colonoscopy was .   He is advised to repeat in 3 years.      Vaccines: declines vaccines      Last eye exam: due     He does see his dentist regularly.    His cardiovascular risks are: smoker    Started smoking at 12 years old    HPI    Lung cancer screen: Negative " 5/2019, yearly fu recommended   Next colonoscopy 2023    Health Maintenance Due   Topic   • TDAP/TD VACCINES (1 - Tdap)   • ZOSTER VACCINE (1 of 2)   • DIABETIC EYE EXAM    • HEMOGLOBIN A1C    • LIPID PANEL    • DIABETIC FOOT EXAM    • URINE MICROALBUMIN    • LUNG CANCER SCREENING           I have reviewed the patient's medical history in detail and updated the computerized patient record.    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       Past Surgical History:   Procedure Laterality Date   • APPENDECTOMY  2002   • COLONOSCOPY W/ POLYPECTOMY  07/20/2018     3 polyps 3-5 mm   • LUMBAR DISCECTOMY  02/12/2018    , L3-4   • SHOULDER SURGERY Right    • SHOULDER SURGERY Left     Dr.Kitty Vann       Family History   Problem Relation Age of Onset   • Brain cancer Mother    • Prostate cancer Father    • Other Father         PAD   • Stroke Father        Social History     Socioeconomic History   • Marital status:      Spouse name: Not on file   • Number of children: 2   • Years of education: Not on file   • Highest education level: Not on file   Tobacco Use   • Smoking status: Current Every Day Smoker     Packs/day: 1.00     Years: 40.00     Pack years: 40.00     Types: Cigarettes   • Smokeless tobacco: Never Used   • Tobacco comment: Began smoking at age 12.  Quit smoking on 2 occasions in the past for a total of 10 years.  Smoked 1 ppd for a 40 pack year history.   Substance and Sexual Activity   • Alcohol use: Yes     Alcohol/week: 1.0 standard drinks     Types: 1 Standard drinks or equivalent per week     Comment: 1 DRINK PER  WEEK   • Drug use: No   • Sexual activity: Defer         There is no immunization history on file for this patient.      Review of Systems:   Review of Systems   Constitutional: Negative for chills, fever and unexpected weight change.   Eyes: Negative.    Respiratory: Negative for shortness of breath.    Cardiovascular: Negative for chest pain and leg swelling.   Gastrointestinal: Negative.    Endocrine: Negative.    Genitourinary: Negative for difficulty urinating, flank pain, frequency and hematuria.   Musculoskeletal: Positive for arthralgias.        Mild contracture to BL hands    Allergic/Immunologic: Negative.    Neurological: Negative.    Hematological: Negative.    Psychiatric/Behavioral: Negative.          Objective:      Physical Exam:   Physical Exam   Constitutional: He is oriented to person, place, and time. He appears well-developed and well-nourished. He is cooperative.   HENT:   Head: Normocephalic and atraumatic.   Right Ear: External ear normal.   Left Ear: External ear normal.   Nose: Nose normal.   Mouth/Throat: Oropharynx is clear and moist and mucous membranes are normal.   Eyes: Pupils are equal, round, and reactive to light. Conjunctivae and EOM are normal.   Neck: Normal range of motion. Neck supple. No JVD present. No thyromegaly present.   Cardiovascular: Normal rate, regular rhythm, S1 normal, S2 normal, normal heart sounds, intact distal pulses and normal pulses. Exam reveals no gallop and no friction rub.   No murmur heard.  Pulses:       Radial pulses are 2+ on the right side, and 2+ on the left side.   Pulmonary/Chest: Effort normal and breath sounds normal. He exhibits no deformity.   Abdominal: Soft. Bowel sounds are normal. There is no hepatosplenomegaly. There is no tenderness. There is negative Holloway's sign. No hernia. Hernia confirmed negative in the right inguinal area and confirmed negative in the left inguinal area.   Genitourinary: Testes normal and penis normal.  "Cremasteric reflex is present. Circumcised.   Musculoskeletal: Normal range of motion. He exhibits no edema.   BL - palms - 4th digits, dupuytren contracture    Lymphadenopathy:     He has no cervical adenopathy.     He has no axillary adenopathy.   Neurological: He is alert and oriented to person, place, and time. He has normal strength. No cranial nerve deficit or sensory deficit. He displays a negative Romberg sign.   Skin: Skin is warm, dry and intact. Capillary refill takes 2 to 3 seconds. No cyanosis. Nails show no clubbing.   Psychiatric: He has a normal mood and affect. His speech is normal and behavior is normal. Judgment and thought content normal. Cognition and memory are normal.   Vitals reviewed.        Vital Signs:  Vitals:    08/14/20 0848   BP: 114/62   Pulse: 68   SpO2: 99%   Weight: 78 kg (172 lb)   Height: 170.2 cm (67\")     Body mass index is 26.94 kg/m².      Results Review:      REVIEWED AND DISCUSSED LAB RESULTS WITH PATIENT  Lab Results   Component Value Date    GLUCOSE 121 (H) 04/26/2019    CALCIUM 9.4 04/26/2019     04/26/2019    K 4.1 04/26/2019    CO2 23.4 04/26/2019     04/26/2019    BUN 15 04/26/2019    CREATININE 0.90 05/03/2019    EGFRIFAFRI 98 06/20/2018    EGFRIFNONA 71 04/26/2019    BCR 14.2 04/26/2019    ANIONGAP 12.6 04/26/2019       Requested Prescriptions     Signed Prescriptions Disp Refills   • tamsulosin (FLOMAX) 0.4 MG capsule 24 hr capsule 30 capsule 5     Sig: Take 1 capsule by mouth every night at bedtime.     Routine medications provided by this office will also be refilled via pharmacy request.       Current Outpatient Medications:   •  tamsulosin (FLOMAX) 0.4 MG capsule 24 hr capsule, Take 1 capsule by mouth every night at bedtime., Disp: 30 capsule, Rfl: 5       Assessment and Plan:        Problem List Items Addressed This Visit        Endocrine    Diabetes mellitus type 2, diet-controlled (CMS/HCC) - Primary     Diabetes is improving with lifestyle " "modifications.   Continue current treatment regimen.  Diabetes will be reassessed in 1 year.         Relevant Orders    Comprehensive Metabolic Panel    Hemoglobin A1c       Genitourinary    Benign prostatic hyperplasia (BPH) with straining on urination     Stable   Check lab for ongoing therapeutic drug monitoring          Relevant Medications    tamsulosin (FLOMAX) 0.4 MG capsule 24 hr capsule    Other Relevant Orders    PSA Screen       Other    Dupuytren's contracture     BL hands - 4th digit  States was told will not have surgery until more contracted          Health care maintenance    Relevant Orders    Comprehensive Metabolic Panel    Lipid Panel With LDL / HDL Ratio    CBC (No Diff)    Tobacco use disorder     Declines cessation.  Aware of cancer risks, cataract risks.            Other Visit Diagnoses     Special screening for malignant neoplasm of prostate        Relevant Orders    PSA Screen             Discussed any change in Rx and discussed visit with patient.  All questions answered.      Return in about 1 year (around 8/14/2021) for Annual physical.    René \"Kal\"Roland, APRN   08/14/20    Dragon disclaimer:   Much of this encounter note is an electronic transcription/translation of spoken language to printed text. The electronic translation of spoken language may permit erroneous, or at times, nonsensical words or phrases to be inadvertently transcribed; Although I have reviewed the note for such errors, some may still exist.     Additional Patient Counseling:       Patient Instructions   Diet:    • Eat vegetables, fruits, whole grain, low-fat dairy, poultry, fish, beans, nontropical vegetable oils, and nuts, but avoid red meat (i.e., Mediterranean-style diet, DASH [Dietary Approaches to Stop Hypertension] diet).  • Limit sugary drinks and sweets.  • Limit saturated and trans fat to 5% to 6% of calories.  • Limit sodium intake to 2,400 mg daily (about one teaspoon table salt [kosher/sea salt " "have less sodium per teaspoon]).  Weight loss / Calorie Counting Apps:    • Lose It!   • MyFitness Pal   • Works great when you try it with a partner/ friend  Exercise:   • Engage in moderate-to-vigorous aerobic activity for at least 40 minutes (on average) three to four times each week.  Wearables:   • Activity tracker   • Step tracker   Skin Care:   • Use sun screen SPF >30 daily  • Dermatologist for skin check regularly  Bone Health:   • [email or web address]    COVID: 19    To help protect yourself and others:  ?Practice \"social distancing.\" It's most important to avoid contact with people who are sick. But social distancing also means staying away from all people who do not live in your household. It is sometimes called \"physical distancing.\"  Avoiding crowds is an important part of social distancing. But even small gatherings can be risky, so it's best to stay home as much as you can. When you do need to go out, try your best to stay at least 6 feet (about 2 meters) away from other people.    ?Wear a cloth face mask when you need to go out. Experts in many countries recommend doing this. It is mostly so that if you are sick, even if you don't have any symptoms, you are less likely to spread the infection to other people. It might also help protect you from others who could be sick, although experts are still studying this.Wear the mask before going in doors and do not remove until outdoors again.  Briefly removing the mask could expose the whole room to a virus you may be carrying.   You can use a cloth or homemade mask to cover your mouth and nose. In most cases, experts recommend leaving medical masks for health workers. Cloth masks work best if they have several layers of fabric (THREE IS BEST). When you take your mask off, make sure you do not touch your eyes, nose, or mouth. And wash your hands after you touch the mask. You can wash the cloth mask with the rest of your laundry. " "It is best to let it air dry.     ?Wash your hands with soap and water often. This is especially important after being out in public or touching surfaces that many other people also touch, like door handles or railings. The risk of getting infected by touching items like this is not well known, but is probably not very high. Still, it's a good idea to wash your hands often.  Make sure to rub your hands with soap for at least 20 seconds, cleaning your wrists, fingernails, and in between your fingers. Then rinse your hands and dry them with a paper towel you can throw away. If you are not near a sink, you can use a hand sanitizing gel to clean your hands. The gels with at least 60 percent alcohol work the best. But it is better to wash with soap and water if you can.    ?Avoid touching your face, especially your mouth, nose, and eyes.    ?Avoid traveling if you can. Some experts recommend not traveling to or from certain areas where there are a lot of cases of COVID-19. But any form of travel, especially if you spend time in crowded places like airports, increases your risk. If lots of people travel, it also makes it more likely that the virus will spread to more parts of the world.  If you do need to travel, be sure to check whether there are any rules about COVID-19 in the area you are visiting. In the [place], some places require people to \"self-quarantine\" for 14 days if they are visiting from another state. This means not going out in public or being around other people. These rules are meant to help prevent new cases of COVID-19.      Smoking causes Lung Cancer    [facility] has a great smoking cessation program ran by Carolann Terrazas.   You may contact [phone] to make an appointment.     Free smoking cessation services:  800-QUIT-NOW  Text QUIT to 62824  [place]: [employer]  Or quitSTART  Https://smokefree.gov        "

## 2020-08-14 NOTE — PATIENT INSTRUCTIONS
"Diet:    • Eat vegetables, fruits, whole grain, low-fat dairy, poultry, fish, beans, nontropical vegetable oils, and nuts, but avoid red meat (i.e., Mediterranean-style diet, DASH [Dietary Approaches to Stop Hypertension] diet).  • Limit sugary drinks and sweets.  • Limit saturated and trans fat to 5% to 6% of calories.  • Limit sodium intake to 2,400 mg daily (about one teaspoon table salt [kosher/sea salt have less sodium per teaspoon]).  Weight loss / Calorie Counting Apps:    • Lose It!   • Variable Pal   • Works great when you try it with a partner/ friend  Exercise:   • Engage in moderate-to-vigorous aerobic activity for at least 40 minutes (on average) three to four times each week.  Wearables:   • Activity tracker   • Step tracker   Skin Care:   • Use sun screen SPF >30 daily  • Dermatologist for skin check regularly  Bone Health:   • [email or web address]    COVID: 19    To help protect yourself and others:  ?Practice \"social distancing.\" It's most important to avoid contact with people who are sick. But social distancing also means staying away from all people who do not live in your household. It is sometimes called \"physical distancing.\"  Avoiding crowds is an important part of social distancing. But even small gatherings can be risky, so it's best to stay home as much as you can. When you do need to go out, try your best to stay at least 6 feet (about 2 meters) away from other people.    ?Wear a cloth face mask when you need to go out. Experts in many countries recommend doing this. It is mostly so that if you are sick, even if you don't have any symptoms, you are less likely to spread the infection to other people. It might also help protect you from others who could be sick, although experts are still studying this.Wear the mask before going in doors and do not remove until outdoors again.  Briefly removing the mask could expose the whole room to a virus you may be " "carrying.   You can use a cloth or homemade mask to cover your mouth and nose. In most cases, experts recommend leaving medical masks for health workers. Cloth masks work best if they have several layers of fabric (THREE IS BEST). When you take your mask off, make sure you do not touch your eyes, nose, or mouth. And wash your hands after you touch the mask. You can wash the cloth mask with the rest of your laundry. It is best to let it air dry.     ?Wash your hands with soap and water often. This is especially important after being out in public or touching surfaces that many other people also touch, like door handles or railings. The risk of getting infected by touching items like this is not well known, but is probably not very high. Still, it's a good idea to wash your hands often.  Make sure to rub your hands with soap for at least 20 seconds, cleaning your wrists, fingernails, and in between your fingers. Then rinse your hands and dry them with a paper towel you can throw away. If you are not near a sink, you can use a hand sanitizing gel to clean your hands. The gels with at least 60 percent alcohol work the best. But it is better to wash with soap and water if you can.    ?Avoid touching your face, especially your mouth, nose, and eyes.    ?Avoid traveling if you can. Some experts recommend not traveling to or from certain areas where there are a lot of cases of COVID-19. But any form of travel, especially if you spend time in crowded places like airports, increases your risk. If lots of people travel, it also makes it more likely that the virus will spread to more parts of the world.  If you do need to travel, be sure to check whether there are any rules about COVID-19 in the area you are visiting. In the [place], some places require people to \"self-quarantine\" for 14 days if they are visiting from another state. This means not going out in public or being around other people. These rules are meant to " help prevent new cases of COVID-19.      Smoking causes Lung Cancer    [facility] has a great smoking cessation program ran by Carolann Terrazas.   You may contact [phone] to make an appointment.     Free smoking cessation services:  800-QUIT-NOW  Text QUIT to 62758  [place]: QuitNow  Or quitSTART  Https://smokefree.gov

## 2020-08-18 DIAGNOSIS — E78.5 HYPERLIPIDEMIA, UNSPECIFIED HYPERLIPIDEMIA TYPE: Primary | ICD-10-CM

## 2020-08-18 RX ORDER — ATORVASTATIN CALCIUM 20 MG/1
20 TABLET, FILM COATED ORAL DAILY
Qty: 30 TABLET | Refills: 2 | Status: SHIPPED | OUTPATIENT
Start: 2020-08-18 | End: 2020-11-23

## 2020-08-18 NOTE — PROGRESS NOTES
Please notify patient that I have reviewed his labs.    He has told me he doesn't like to take medication, but his cholesterol is still going up and with his smoking history and father's history of a stroke - he should be on a statin.  He has a 27% risk of cardiac event over the next 10 years.     If he agrees, lipitor 20 mg qHS and fu in 8 wks.  Notify me if any problems with the medication.       WCN    The 10-year ASCVD risk score (Karoline HACKETT Jr., et al., 2013) is: 26.4%    Values used to calculate the score:      Age: 65 years      Sex: Male      Is Non- : No      Diabetic: Yes      Tobacco smoker: Yes      Systolic Blood Pressure: 114 mmHg      Is BP treated: No      HDL Cholesterol: 56 mg/dL      Total Cholesterol: 254 mg/dL

## 2020-10-02 ENCOUNTER — RESULTS ENCOUNTER (OUTPATIENT)
Dept: INTERNAL MEDICINE | Facility: CLINIC | Age: 65
End: 2020-10-02

## 2020-10-02 DIAGNOSIS — E78.5 HYPERLIPIDEMIA, UNSPECIFIED HYPERLIPIDEMIA TYPE: ICD-10-CM

## 2020-11-23 RX ORDER — ATORVASTATIN CALCIUM 20 MG/1
TABLET, FILM COATED ORAL
Qty: 30 TABLET | Refills: 2 | Status: SHIPPED | OUTPATIENT
Start: 2020-11-23 | End: 2021-11-17

## 2021-02-06 DIAGNOSIS — R39.16 BENIGN PROSTATIC HYPERPLASIA (BPH) WITH STRAINING ON URINATION: ICD-10-CM

## 2021-02-06 DIAGNOSIS — N40.1 BENIGN PROSTATIC HYPERPLASIA (BPH) WITH STRAINING ON URINATION: ICD-10-CM

## 2021-02-07 RX ORDER — TAMSULOSIN HYDROCHLORIDE 0.4 MG/1
1 CAPSULE ORAL
Qty: 30 CAPSULE | Refills: 5 | Status: SHIPPED | OUTPATIENT
Start: 2021-02-07 | End: 2021-08-17 | Stop reason: SDUPTHER

## 2021-03-19 ENCOUNTER — BULK ORDERING (OUTPATIENT)
Dept: CASE MANAGEMENT | Facility: OTHER | Age: 66
End: 2021-03-19

## 2021-03-19 DIAGNOSIS — Z23 IMMUNIZATION DUE: ICD-10-CM

## 2021-08-17 DIAGNOSIS — R39.16 BENIGN PROSTATIC HYPERPLASIA (BPH) WITH STRAINING ON URINATION: ICD-10-CM

## 2021-08-17 DIAGNOSIS — N40.1 BENIGN PROSTATIC HYPERPLASIA (BPH) WITH STRAINING ON URINATION: ICD-10-CM

## 2021-08-17 RX ORDER — TAMSULOSIN HYDROCHLORIDE 0.4 MG/1
1 CAPSULE ORAL
Qty: 30 CAPSULE | Refills: 2 | Status: SHIPPED | OUTPATIENT
Start: 2021-08-17 | End: 2021-09-16

## 2021-08-17 RX ORDER — TAMSULOSIN HYDROCHLORIDE 0.4 MG/1
1 CAPSULE ORAL
Qty: 30 CAPSULE | Refills: 0 | Status: SHIPPED | OUTPATIENT
Start: 2021-08-17 | End: 2021-08-17

## 2021-08-17 NOTE — TELEPHONE ENCOUNTER
Caller: Cecilia Holloway    Relationship: Emergency Contact    Best call back number: [phone]*    Medication needed:   Requested Prescriptions     Pending Prescriptions Disp Refills   • tamsulosin (FLOMAX) 0.4 MG capsule 24 hr capsule 30 capsule 5     Sig: Take 1 capsule by mouth every night at bedtime.       When do you need the refill by: 8/30/21    What additional details did the patient provide when requesting the medication: PATIENT HAS 2 WEEKS OF MEDICATION REMAINING. REQUEST A 90-DAY REFILL.    Does the patient have less than a 3 day supply:  [] Yes  [x] No    What is the patient's preferred pharmacy: [id number] [address] AT [facility] [phone] [facility] [phone] FX

## 2021-09-16 DIAGNOSIS — R39.16 BENIGN PROSTATIC HYPERPLASIA (BPH) WITH STRAINING ON URINATION: ICD-10-CM

## 2021-09-16 DIAGNOSIS — N40.1 BENIGN PROSTATIC HYPERPLASIA (BPH) WITH STRAINING ON URINATION: ICD-10-CM

## 2021-09-16 RX ORDER — TAMSULOSIN HYDROCHLORIDE 0.4 MG/1
1 CAPSULE ORAL
Qty: 30 CAPSULE | Refills: 2 | Status: SHIPPED | OUTPATIENT
Start: 2021-09-16 | End: 2021-11-17

## 2021-11-17 ENCOUNTER — OFFICE VISIT (OUTPATIENT)
Dept: INTERNAL MEDICINE | Facility: CLINIC | Age: 66
End: 2021-11-17

## 2021-11-17 VITALS
WEIGHT: 177.6 LBS | HEIGHT: 67 IN | SYSTOLIC BLOOD PRESSURE: 130 MMHG | TEMPERATURE: 98.2 F | OXYGEN SATURATION: 94 % | BODY MASS INDEX: 27.88 KG/M2 | DIASTOLIC BLOOD PRESSURE: 51 MMHG | HEART RATE: 73 BPM

## 2021-11-17 DIAGNOSIS — R73.03 PREDIABETES: Chronic | ICD-10-CM

## 2021-11-17 DIAGNOSIS — F17.200 TOBACCO USE DISORDER: ICD-10-CM

## 2021-11-17 DIAGNOSIS — N40.1 BENIGN PROSTATIC HYPERPLASIA (BPH) WITH STRAINING ON URINATION: ICD-10-CM

## 2021-11-17 DIAGNOSIS — M72.0 DUPUYTREN'S CONTRACTURE: ICD-10-CM

## 2021-11-17 DIAGNOSIS — Z00.00 ANNUAL PHYSICAL EXAM: Primary | ICD-10-CM

## 2021-11-17 DIAGNOSIS — K57.92 DIVERTICULITIS: Chronic | ICD-10-CM

## 2021-11-17 DIAGNOSIS — H54.40 BLINDNESS OF LEFT EYE, UNSPECIFIED RIGHT EYE VISUAL IMPAIRMENT CATEGORY: ICD-10-CM

## 2021-11-17 DIAGNOSIS — E78.49 FAMILIAL HYPERLIPIDEMIA, HIGH LDL: ICD-10-CM

## 2021-11-17 DIAGNOSIS — Z12.5 SPECIAL SCREENING, PROSTATE CANCER: ICD-10-CM

## 2021-11-17 DIAGNOSIS — R39.16 BENIGN PROSTATIC HYPERPLASIA (BPH) WITH STRAINING ON URINATION: ICD-10-CM

## 2021-11-17 DIAGNOSIS — R13.10 DYSPHAGIA, UNSPECIFIED TYPE: ICD-10-CM

## 2021-11-17 PROBLEM — Z86.19: Status: ACTIVE | Noted: 2021-11-17

## 2021-11-17 PROCEDURE — 99396 PREV VISIT EST AGE 40-64: CPT | Performed by: NURSE PRACTITIONER

## 2021-11-17 RX ORDER — TAMSULOSIN HYDROCHLORIDE 0.4 MG/1
1 CAPSULE ORAL
Qty: 90 CAPSULE | Refills: 1 | Status: SHIPPED | OUTPATIENT
Start: 2021-11-17 | End: 2022-02-24 | Stop reason: SDUPTHER

## 2021-11-17 NOTE — ASSESSMENT & PLAN NOTE
Declines statin.    Aware of risk of stroke, heart attack given elevated cholesterol, poor diet and smoking.     Discussed with him and daughter and states he is ok with risk.

## 2021-11-17 NOTE — ASSESSMENT & PLAN NOTE
Will send for 2nd opinion as he states he is having more difficulty gardening due to contracture of hand and discomfort.

## 2021-11-17 NOTE — PATIENT INSTRUCTIONS
Smoking causes Lung Cancer    [facility] has a great smoking cessation program ran by Carolann Terrazas.   You may contact [phone] to make an appointment.     Free smoking cessation services:  800-QUIT-NOW  Text QUIT to 55852  [place]: QuitNow  Or quitSTART  Https://smokefree.gov

## 2021-11-17 NOTE — PROGRESS NOTES
"        Chief Complaint  Annual Exam (physical)     Subjective:      History of Present Illness {CC  Problem List  Visit  Diagnosis   Encounters  Notes  Medications  Labs  Result Review Imaging  Media :23}     Quan Holloway presents to [facility] for annual exam.     His daughter is here with him and helps with history.     Hyperlipidemia: chronic.  Since last visit, stopped statin on his own accord.  Describes his diet at bad but not prepared to change.  Current smoker.     BPH: chronic and improved on flomax. States when he misses a few doses, difficult to start stream.     New issue: difficultly swallowing fluids at times.  Swallows foods fine. Denies choking.  No unexplained weight loss. Has not had EGD that he can recall.  No abd pain.       Quan is here for coordination of medical care, to discuss health maintenance, disease prevention as well as to follow up on medical problems.       Patient Care Team:  Amena Watkins III, NP-C as PCP - General (Family Medicine)  Sadia Dowling APRN as Nurse Practitioner (Oncology)        He states that his activity level is moderate.   His diet is in general, an \"unhealthy\" diet.   Feels fairly well with few complaints.     Works in garden bending over.   [employer]     Health and Weight:   Weight trend is   Wt Readings from Last 4 Encounters:   11/17/21 80.6 kg (177 lb 9.6 oz)   08/14/20 78 kg (172 lb)   05/10/19 80.3 kg (177 lb)   05/03/19 80.3 kg (177 lb)           Mental Health Check:   Depression screen: PHQ-2 Total Score:      Blood Pressure:   BP Readings from Last 3 Encounters:   11/17/21 130/51   08/14/20 114/62   05/10/19 130/78        Cholesterol Screen:   Most men start screen at 35, if you have family history or comorbidities it may be screen earlier.     Risk Evaluation:  1. Cardiovascular risk factors: hyperlipidemia, tobacco abuse, male gender.  2. Diabetes risk factors: " prediabetes.   3. Cancer risk factors: tobacco smoking.    Prevention:   Cholesterol test will check. Cholesterol is elevated..  Blood sugar test will check. Fasting blood sugar You have a high risk of developing diabetes. will be checked..  Hepatitis  C screen: [] Due  [] Completed :     Colon Cancer Screen:   Colonoscopy up to date. Recommended every 5 years. Next screening is recommended in 2023..      Genital/ Urinary Health:   · He voids without difficulty. With Flomax      Prostate cancer screening:  PSA was discussed and ordered He has no increased risk of prostate cancer.   Family history: [x] None    [] Yes:     Lung Cancer Screen:   [] Nonsmoker  [x] History of smoking  [x] Current smoker    Last eye exam:  UTD   Always where sunglass when outside with UV protection.     Skin Cancer:   Regular Sunsceen: Advised  Routine self assessment of your skin, report any changes.     His cardiovascular risks are:    [] No Known risk factors    [] Hypertension   [x] Hyperlipidemia  [] Diabetes    [] Obesity  [] Family history   [x] Current or hx tobacco use  [] Sedentary lifestyle   [] Testosterone use       Patient has been erroneously marked as diabetic. Based on the available clinical information, he does not have diabetes and should therefore be excluded from diabetic health maintenance and quality measures for the remainder of the reporting period.    I have reviewed patient's medical history, any new submitted information provided by patient or medical assistant and updated medical record.      Objective:      Physical Exam  Vitals reviewed.   Constitutional:       Appearance: He is well-developed.   HENT:      Head: Normocephalic and atraumatic.      Right Ear: External ear normal.      Left Ear: External ear normal.      Nose: Nose normal.   Eyes:      Conjunctiva/sclera: Conjunctivae normal.      Comments: Blind left eye and deviates outward    Neck:      Thyroid: No thyromegaly.      Vascular: No JVD.  "  Cardiovascular:      Rate and Rhythm: Normal rate and regular rhythm.      Pulses: Normal pulses.           Radial pulses are 2+ on the right side and 2+ on the left side.      Heart sounds: Normal heart sounds, S1 normal and S2 normal. No murmur heard.  No friction rub. No gallop.    Pulmonary:      Effort: Pulmonary effort is normal.      Breath sounds: Normal breath sounds.   Chest:      Chest wall: No deformity.   Abdominal:      General: Bowel sounds are normal.      Palpations: Abdomen is soft.      Tenderness: There is no abdominal tenderness. Negative signs include Holloway's sign.      Hernia: No hernia is present.   Musculoskeletal:         General: Normal range of motion.        Hands:       Cervical back: Normal range of motion and neck supple.      Right lower leg: No edema.      Left lower leg: No edema.      Comments: Dupuytren contracture bl    Lymphadenopathy:      Cervical: No cervical adenopathy.   Skin:     General: Skin is warm and dry.      Capillary Refill: Capillary refill takes 2 to 3 seconds.      Nails: There is no clubbing.   Neurological:      General: No focal deficit present.      Mental Status: He is alert and oriented to person, place, and time.      Cranial Nerves: No cranial nerve deficit.      Sensory: No sensory deficit.      Motor: Motor function is intact. No weakness.   Psychiatric:         Mood and Affect: Mood normal.         Speech: Speech normal.         Behavior: Behavior normal. Behavior is cooperative.         Thought Content: Thought content normal.         Judgment: Judgment normal.        Result Review  Data Reviewed:{ Labs  Result Review  Imaging  Med Tab  Media :23}                  Vital Signs:   /51 (BP Location: Left arm, Patient Position: Sitting, Cuff Size: Adult)   Pulse 73   Temp 98.2 °F (36.8 °C) (Temporal)   Ht 170.2 cm (67.01\")   Wt 80.6 kg (177 lb 9.6 oz)   SpO2 94%   BMI 27.81 kg/m²         Requested Prescriptions     Signed " Prescriptions Disp Refills   • tamsulosin (FLOMAX) 0.4 MG capsule 24 hr capsule 90 capsule 1     Sig: Take 1 capsule by mouth every night at bedtime.       Routine medications provided by this office will also be refilled via pharmacy request.       Current Outpatient Medications:   •  tamsulosin (FLOMAX) 0.4 MG capsule 24 hr capsule, Take 1 capsule by mouth every night at bedtime., Disp: 90 capsule, Rfl: 1     Assessment and Plan:      Assessment and Plan {CC Problem List  Visit Diagnosis  ROS  Review (Popup)  Health Maintenance  Quality  BestPractice  Medications  SmartSets  SnapShot Encounters  Media :23}     Problem List Items Addressed This Visit        Cardiac and Vasculature    Familial hyperlipidemia, high LDL (Chronic)    Current Assessment & Plan     Declines statin.    Aware of risk of stroke, heart attack given elevated cholesterol, poor diet and smoking.     Discussed with him and daughter and states he is ok with risk.          Relevant Orders    Lipid Panel With LDL / HDL Ratio       Endocrine and Metabolic    Prediabetes (Chronic)    Overview     06/18         Relevant Orders    Hemoglobin A1c       Eye    Blind left eye (Chronic)    Overview     Since youth - states has had several procedures and could not correct             Gastrointestinal Abdominal     Diverticulitis (Chronic)    Overview     Life long high fiber diet             Genitourinary and Reproductive     Benign prostatic hyperplasia (BPH) with straining on urination (Chronic)    Current Assessment & Plan     Improved on flomax, continue.          Relevant Medications    tamsulosin (FLOMAX) 0.4 MG capsule 24 hr capsule       Musculoskeletal and Injuries    Dupuytren's contracture (Chronic)    Overview     BL hands (since 2016)          Current Assessment & Plan     Will send for 2nd opinion as he states he is having more difficulty gardening due to contracture of hand and discomfort.          Relevant Orders    Ambulatory  Referral to Hand Surgery       Tobacco    Tobacco use disorder (Chronic)    Current Assessment & Plan     Declines cessation.            Other Visit Diagnoses     Annual physical exam    -  Primary    Relevant Orders    Comprehensive Metabolic Panel    Lipid Panel With LDL / HDL Ratio    CBC (No Diff)    Special screening, prostate cancer        Relevant Orders    PSA SCREENING    Dysphagia, unspecified type        Relevant Orders    Ambulatory Referral to Gastroenterology          Follow Up {Instructions Charge Capture  Follow-up Communications :23}     Return in about 1 year (around 11/17/2022) for Annual physical.    Patient was given instructions and counseling regarding his condition or for health maintenance advice. Please see specific information pulled into the AVS if appropriate.    Dragon disclaimer:   Much of this encounter note is an electronic transcription/translation of spoken language to printed text. The electronic translation of spoken language may permit erroneous, or at times, nonsensical words or phrases to be inadvertently transcribed; Although I have reviewed the note for such errors, some may still exist.     Additional Patient Counseling:       Patient Instructions   Smoking causes Lung Cancer    [facility] has a great smoking cessation program ran by Carolann Terrazas.   You may contact [phone] to make an appointment.     Free smoking cessation services:  800-QUIT-NOW  Text QUIT to 40227  [place]: QuitNow  Or quitSTART  Https://smokefree.gov

## 2021-11-18 LAB
ALBUMIN SERPL-MCNC: 4.4 G/DL (ref 3.8–4.8)
ALBUMIN/GLOB SERPL: 1.8 {RATIO} (ref 1.2–2.2)
ALP SERPL-CCNC: 112 IU/L (ref 44–121)
ALT SERPL-CCNC: 20 IU/L (ref 0–44)
AST SERPL-CCNC: 11 IU/L (ref 0–40)
BILIRUB SERPL-MCNC: 0.3 MG/DL (ref 0–1.2)
BUN SERPL-MCNC: 19 MG/DL (ref 8–27)
BUN/CREAT SERPL: 19 (ref 10–24)
CALCIUM SERPL-MCNC: 9.5 MG/DL (ref 8.6–10.2)
CHLORIDE SERPL-SCNC: 100 MMOL/L (ref 96–106)
CHOLEST SERPL-MCNC: 231 MG/DL (ref 100–199)
CO2 SERPL-SCNC: 23 MMOL/L (ref 20–29)
CREAT SERPL-MCNC: 0.99 MG/DL (ref 0.76–1.27)
ERYTHROCYTE [DISTWIDTH] IN BLOOD BY AUTOMATED COUNT: 12.3 % (ref 11.6–15.4)
GLOBULIN SER CALC-MCNC: 2.4 G/DL (ref 1.5–4.5)
GLUCOSE SERPL-MCNC: 269 MG/DL (ref 65–99)
HBA1C MFR BLD: 7.2 % (ref 4.8–5.6)
HCT VFR BLD AUTO: 48.6 % (ref 37.5–51)
HDLC SERPL-MCNC: 54 MG/DL
HGB BLD-MCNC: 16.6 G/DL (ref 13–17.7)
LDLC SERPL CALC-MCNC: 147 MG/DL (ref 0–99)
LDLC/HDLC SERPL: 2.7 RATIO (ref 0–3.6)
MCH RBC QN AUTO: 31.6 PG (ref 26.6–33)
MCHC RBC AUTO-ENTMCNC: 34.2 G/DL (ref 31.5–35.7)
MCV RBC AUTO: 92 FL (ref 79–97)
PLATELET # BLD AUTO: 239 X10E3/UL (ref 150–450)
POTASSIUM SERPL-SCNC: 4.2 MMOL/L (ref 3.5–5.2)
PROT SERPL-MCNC: 6.8 G/DL (ref 6–8.5)
PSA SERPL-MCNC: 4.3 NG/ML (ref 0–4)
RBC # BLD AUTO: 5.26 X10E6/UL (ref 4.14–5.8)
SODIUM SERPL-SCNC: 138 MMOL/L (ref 134–144)
TRIGL SERPL-MCNC: 168 MG/DL (ref 0–149)
VLDLC SERPL CALC-MCNC: 30 MG/DL (ref 5–40)
WBC # BLD AUTO: 9 X10E3/UL (ref 3.4–10.8)

## 2021-11-23 NOTE — PROGRESS NOTES
Please call and notify  Mr. Holloway,   You should call his daughter and discuss with her if there is a release.     Recent lab work:     1) His cholesterol is not controlled.  He should resume his cholesterol medication.     2) His PSA is elevated - this looks at risks for prostate issues.  I advise he be evaluated by urology.  If they agree, need to place order to urology, Dr Coleman, for elevated PSA.     3) His blood sugars have been elevated.  We discussed this when he was here. I did check his 3 month average and he is now classified as diabetic.   I recommend him to start metformin for diabetes.   Please let me know if they agree and I will send in medication.         GREGN

## 2021-11-24 DIAGNOSIS — R97.20 ELEVATED PSA: Primary | ICD-10-CM

## 2021-12-01 DIAGNOSIS — E11.65 TYPE 2 DIABETES MELLITUS WITH HYPERGLYCEMIA, WITHOUT LONG-TERM CURRENT USE OF INSULIN (HCC): Primary | ICD-10-CM

## 2021-12-01 RX ORDER — METFORMIN HYDROCHLORIDE 750 MG/1
750 TABLET, EXTENDED RELEASE ORAL
Qty: 30 TABLET | Refills: 2 | Status: SHIPPED | OUTPATIENT
Start: 2021-12-01 | End: 2022-02-24 | Stop reason: SDUPTHER

## 2021-12-17 ENCOUNTER — CLINICAL SUPPORT (OUTPATIENT)
Dept: OTHER | Facility: HOSPITAL | Age: 66
End: 2021-12-17

## 2021-12-17 ENCOUNTER — HOSPITAL ENCOUNTER (OUTPATIENT)
Dept: PET IMAGING | Facility: HOSPITAL | Age: 66
Discharge: HOME | End: 2021-12-17

## 2021-12-17 VITALS — BODY MASS INDEX: 26.67 KG/M2 | WEIGHT: 176 LBS | HEIGHT: 68 IN

## 2021-12-17 DIAGNOSIS — Z12.2 SCREENING FOR MALIGNANT NEOPLASM OF RESPIRATORY ORGAN: Primary | ICD-10-CM

## 2021-12-17 DIAGNOSIS — F17.210 SMOKING GREATER THAN 30 PACK YEARS: ICD-10-CM

## 2021-12-17 DIAGNOSIS — Z12.2 SCREENING FOR MALIGNANT NEOPLASM OF RESPIRATORY ORGAN: ICD-10-CM

## 2021-12-17 PROCEDURE — 71271 CT THORAX LUNG CANCER SCR C-: CPT

## 2021-12-17 PROCEDURE — G0296 VISIT TO DETERM LDCT ELIG: HCPCS | Performed by: CLINICAL NURSE SPECIALIST

## 2021-12-17 NOTE — PROGRESS NOTES
[facility] Low-Dose Lung Cancer CT Screening Visit    Quan Holloway is a pleasant 66 y.o. male seen today at the request of BARBARA Chatman in our Lung Cancer Screening Clinic, being seen for Lung Cancer Screening Counseling and a Shared Decision Making Visit prior to Low-Dose Lung Cancer Screening CT exam.    SMOKING HISTORY:   Social History     Tobacco Use   Smoking Status Current Every Day Smoker   • Packs/day: 1.00   • Years: 44.00   • Pack years: 44.00   • Types: Cigarettes   Smokeless Tobacco Never Used   Tobacco Comment    Began smoking at age 12.  Quit smoking on 2 occasions in the past for a total of 10 years.  Smoked 1 ppd for a 44 pack year history.       Quan Holloway is currently smoking 1 pack per day with a 44 pack year history.  Reports no use of alternate forms of tobacco, electronic cigarettes, marijuana or other substances.  Based on the recommendation of the [place] Preventive Services Task Force, this patient is at high risk for lung cancer and a low-dose CT screening scan is recommended.     We discussed the connection between Radon and Lung Cancer and the availability of free test kits.  He has no basement in his home.  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  Some second-hand smoke exposure as a child with his father and grandfather smoking in their home.  He has no current second-hand exposure.    The patient has had no hemoptysis, unintentional weight loss or increasing shortness of breath. The patient is asymptomatic and has no signs or symptoms of lung cancer.   The patient reports NO personal history of cancer.  Additionally, reports NO family history of lung cancer.  He has peripheral vascular disease and his last low-dose lung cancer screening CT in May, 2019 was negative.  We discussed the COVID vaccination as I encouraged him to be vaccinated.  He states that about 20 years ago when he came to this  country from [place], he was given a flu vaccination and immediately became very ill and he now refuses all vaccinations of any type.  He had NO plan to receive the COVID vaccination or any other vaccinations for that reason.  He has no chronic pulmonary disease.    Together we discussed the potential benefits and potential harms of being screened for lung cancer including the potential for follow-up diagnostic testing, risk for over diagnosis, false positive rate and total radiation exposure using the [facility] standardized decision aid. We reviewed the patient's smoking history and counseled on the importance and health benefits of quitting smoking.    Smoking Cessation  DISCUSSION HELD TODAY:     We discussed that there are a number of resources and tobacco cessation interventions to assist with smoking cessation including the 1-800-Quit Now line, Health Department programs, [place] [facility] resources, and use of the [place] Department of Health and Human Services five keys for quitting and quit plan worksheet.  On a scale of zero to ten, the patient rates their motivation and readiness to quit at a 0 out of 10 today.  He has tried to quit tobacco in the past but always buys the next pack of cigarettes.  He states that he knows that it is up to him to decide when it is time to quit and this is not his time to quit.    Recommendations for continued lung cancer screening:      We discussed the NCCN guidelines for lung cancer screening and the patient verbalized understanding that annual screening is recommended until fifteen years beyond smoking as long as they have no other disease or comorbidity that would prevent them from receiving cancer treatments such as surgery should a lung cancer be detected. The [facility] Lung Cancer Screening Shared Decision-Making Tool was utilized as an aid in discussing whether or not screening was right. The patient has decided to proceed with a  Low Dose Lung Cancer Screening CT today. The patient is aware that the results of his screening will be shared with the referring provider or PCP as well.       The patient verbalizes understanding that results of this low dose lung CT exam will be called and that assistance will be provided in arranging any necessary follow-up.    After review of the NCCN guidelines and recommendations for ongoing screening, the patient verbalized understanding of recommendations for follow-up. We discussed the importance of adherence to continued annual screening until 15 years beyond smoking or until other life-limiting comorbidities are present. We discussed the impact of comorbidities and ability and willing to undergo diagnosis and treatment.    The patient has been counseled on the health benefits of quitting tobacco, smoking cessation strategies and resources, as well as the importance of adherence to annual lung cancer low-dose CT screening.     Sadia Dowling, MSN, APRN, ACNS-BC, AOCN, CHPN  Clinical Nurse Specialist  [facility]

## 2021-12-17 NOTE — PROGRESS NOTES
I have called the patient and at his request, his daughter as well, with results - Lung RADS Category 1 - Negative with nothing concerning for lung cancer.  He is aware that annual low-dose lung cancer screening is recommended for him as long as he is smoking and for 15 years past smoking through the age of 82.

## 2021-12-17 NOTE — PATIENT INSTRUCTIONS
The patient verbalizes understanding that annual low-dose lung cancer screening is recommended based on his tobacco use history as he is at higher risk for developing lung cancer.

## 2022-01-19 ENCOUNTER — TELEPHONE (OUTPATIENT)
Dept: INTERNAL MEDICINE | Facility: CLINIC | Age: 67
End: 2022-01-19

## 2022-01-19 NOTE — TELEPHONE ENCOUNTER
ATTENTION PRACTICE MANAGER     Caller: Cecilia Holloway    Relationship: Emergency Contact    Best call back number:  [phone]    Who is your current provider: REBA HERNANDEZ    Who would you like your new provider to be: DR. COE    What are your reasons for transferring care: LANGUAGE PREFERANCE    Additional notes: PATIENT'S DAUGHTER STATES PATIENT HAS DIFFICULTY WITH ENGLISH LANGUAGE AND DR. COE SPEAKS SAME LANGUAGE.  SHE STATES THEY PREFER NOT TO NEED A .      PLEASE ADVISE.

## 2022-02-01 ENCOUNTER — OFFICE VISIT (OUTPATIENT)
Dept: GASTROENTEROLOGY | Facility: CLINIC | Age: 67
End: 2022-02-01

## 2022-02-01 VITALS — TEMPERATURE: 97.5 F | WEIGHT: 176 LBS | HEIGHT: 68 IN | BODY MASS INDEX: 26.67 KG/M2

## 2022-02-01 DIAGNOSIS — R13.12 OROPHARYNGEAL DYSPHAGIA: Primary | ICD-10-CM

## 2022-02-01 PROCEDURE — 99203 OFFICE O/P NEW LOW 30 MIN: CPT | Performed by: INTERNAL MEDICINE

## 2022-02-01 NOTE — PROGRESS NOTES
Chief Complaint   Patient presents with   • Difficulty Swallowing     Quan Holloway is a 66 y.o. male who presents with a history of oropharyngeal dysphagia  HPI     Patient 66-year-old male with history of osteoarthritis vestibular neuritis and a herniated disc who presents with 6 months of dysphagia.  Patient reports about 6 months ago had an episode eating fish developed acute oropharyngeal pain and difficulty swallowing felt like he was stuck with a fishbone.  Symptoms lasted through the evening but by morning they completely resolved.  Since that time however has had intermittent symptoms in that area often related to carbonated beverages.  Patient reports no trouble actually swallowing solids.    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       Current Outpatient Medications:   •  metFORMIN ER (GLUCOPHAGE-XR) 750 MG 24 hr tablet, Take 1 tablet by mouth Daily With Breakfast., Disp: 30 tablet, Rfl: 2  •  tamsulosin (FLOMAX) 0.4 MG capsule 24 hr capsule, Take 1 capsule by mouth every night at bedtime., Disp: 90 capsule, Rfl: 1  No Known Allergies  Social History     Socioeconomic History   • Marital status:    • Number of children: 2   Tobacco Use   • Smoking status: Current Every Day Smoker     Packs/day: 1.00     Years: 44.00     Pack years: 44.00     Types: Cigarettes   • Smokeless tobacco: Never Used   • Tobacco comment: Began smoking at age 12.  Quit smoking on 2 occasions in the past for a total of 10 years.  Smoked 1 ppd for a 44 pack year history.   Vaping Use   • Vaping Use: Never used   Substance and Sexual Activity   • Alcohol  use: Yes     Alcohol/week: 1.0 standard drink     Types: 1 Standard drinks or equivalent per week     Comment: 1 DRINK PER WEEK   • Drug use: No   • Sexual activity: Defer     Family History   Problem Relation Age of Onset   • Brain cancer Mother    • Prostate cancer Father    • Other Father         PAD   • Stroke Father    • Stomach cancer Paternal Grandfather      Review of Systems   Constitutional: Negative.    HENT: Positive for trouble swallowing. Negative for sinus pressure, sneezing, sore throat, tinnitus and voice change.    Eyes: Negative.    Respiratory: Negative.    Cardiovascular: Negative.    Gastrointestinal: Negative.    Endocrine: Negative.    Musculoskeletal: Negative.    Skin: Negative.    Allergic/Immunologic: Negative.    Hematological: Negative.      Vitals:    02/01/22 1143   Temp: 97.5 °F (36.4 °C)     Physical Exam  Vitals and nursing note reviewed.   Constitutional:       Appearance: Normal appearance. He is well-developed and normal weight.   HENT:      Head: Normocephalic and atraumatic.   Eyes:      General: No scleral icterus.     Conjunctiva/sclera: Conjunctivae normal.   Neck:      Trachea: No tracheal deviation.   Cardiovascular:      Rate and Rhythm: Normal rate and regular rhythm.      Heart sounds: No murmur heard.  No friction rub. No gallop.    Pulmonary:      Effort: Pulmonary effort is normal. No respiratory distress.      Breath sounds: Normal breath sounds. No wheezing or rales.   Abdominal:      General: Bowel sounds are normal. There is no distension.      Palpations: Abdomen is soft. There is no mass.      Tenderness: There is no abdominal tenderness. There is no guarding or rebound.   Musculoskeletal:         General: Normal range of motion.      Cervical back: Normal range of motion.   Skin:     General: Skin is warm and dry.      Coloration: Skin is not jaundiced.      Findings: No rash.   Neurological:      General: No focal deficit present.      Mental Status: He is  alert and oriented to person, place, and time.      Cranial Nerves: No cranial nerve deficit.   Psychiatric:         Behavior: Behavior normal.         Thought Content: Thought content normal.         Judgment: Judgment normal.       Diagnoses and all orders for this visit:    Oropharyngeal dysphagia  -     FL Esophagram Complete; Future    Patient 66-year-old male with history of osteoarthritis, vestibular neuritis and herniated nucleus pulposus presenting with intermittent oropharyngeal dysphagia.  Patient reports symptoms seem to start around June when he was eating a piece of fish felt like almost a fishbone scratched his esophagus.  Patient reports symptoms overnight of the food being persistently there by the next day and it improved.  Patient reports since then intermittently will get symptoms particularly with carbonated beverages of the sensation that food is getting hung up there.  Solids seem to go down fine and in the winter when he is not drinking beer or carbonated sodas drinks more milk and coffee the symptoms do not seem to bother him.  Currently doing well for now would arrange esophagram to evaluate for any scarring or even foreign body though nothing noted on CT of the chest done recently for lung screening will evaluate for any changes consider further intervention if symptoms return.

## 2022-02-24 ENCOUNTER — OFFICE VISIT (OUTPATIENT)
Dept: FAMILY MEDICINE CLINIC | Facility: CLINIC | Age: 67
End: 2022-02-24

## 2022-02-24 VITALS
OXYGEN SATURATION: 95 % | DIASTOLIC BLOOD PRESSURE: 62 MMHG | SYSTOLIC BLOOD PRESSURE: 138 MMHG | HEART RATE: 94 BPM | TEMPERATURE: 97.3 F | BODY MASS INDEX: 26.97 KG/M2 | WEIGHT: 177.4 LBS

## 2022-02-24 DIAGNOSIS — N40.1 BENIGN PROSTATIC HYPERPLASIA (BPH) WITH STRAINING ON URINATION: ICD-10-CM

## 2022-02-24 DIAGNOSIS — R97.20 ELEVATED PSA: ICD-10-CM

## 2022-02-24 DIAGNOSIS — R39.16 BENIGN PROSTATIC HYPERPLASIA (BPH) WITH STRAINING ON URINATION: ICD-10-CM

## 2022-02-24 DIAGNOSIS — E11.65 TYPE 2 DIABETES MELLITUS WITH HYPERGLYCEMIA, WITHOUT LONG-TERM CURRENT USE OF INSULIN: Primary | ICD-10-CM

## 2022-02-24 DIAGNOSIS — E78.49 FAMILIAL HYPERLIPIDEMIA, HIGH LDL: ICD-10-CM

## 2022-02-24 PROBLEM — R73.03 PREDIABETES: Chronic | Status: RESOLVED | Noted: 2018-06-22 | Resolved: 2022-02-24

## 2022-02-24 PROCEDURE — 99214 OFFICE O/P EST MOD 30 MIN: CPT | Performed by: FAMILY MEDICINE

## 2022-02-24 RX ORDER — METFORMIN HYDROCHLORIDE 750 MG/1
750 TABLET, EXTENDED RELEASE ORAL
Qty: 90 TABLET | Refills: 3 | Status: SHIPPED | OUTPATIENT
Start: 2022-02-24 | End: 2022-05-26 | Stop reason: SDUPTHER

## 2022-02-24 RX ORDER — TAMSULOSIN HYDROCHLORIDE 0.4 MG/1
1 CAPSULE ORAL
Qty: 90 CAPSULE | Refills: 3 | Status: SHIPPED | OUTPATIENT
Start: 2022-02-24 | End: 2022-12-08 | Stop reason: SDUPTHER

## 2022-02-24 NOTE — PROGRESS NOTES
Subjective   Quan Holloway is a 66 y.o. male.     Chief Complaint   Patient presents with   • Establish Care       History of Present Illness     New patient today.  Getting established.  This new patient is very medically complex.  Past medical history includes prediabetes now full-blown diabetes with recent A1c at 7.2.  History of BPH with elevated PSA.  Long history of smoking.  Hyperlipidemia.  Peripheral arterial disease.    Blind on left eye.  He is delinquent on all of his immunizations and refuses all of his immunizations.      The following portions of the patient's history were reviewed and updated as appropriate: allergies, current medications, past family history, past medical history, past social history, past surgical history and problem list.    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       Past Surgical History:   Procedure Laterality Date   • APPENDECTOMY  2002   • COLONOSCOPY W/ POLYPECTOMY  07/20/2018     3 polyps 3-5 mm   • LUMBAR DISCECTOMY  02/12/2018    , L3-4   • SHOULDER SURGERY Right    • SHOULDER SURGERY Left     Dr.Kitty Vann       Family History   Problem Relation Age of Onset   • Brain cancer Mother    • Prostate cancer Father    • Other Father         PAD   • Stroke Father    • Stomach cancer Paternal Grandfather        Social History     Socioeconomic History   • Marital status:    • Number of children: 2   Tobacco Use   • Smoking status: Current Every Day Smoker     Packs/day: 1.00     Years: 44.00     Pack years: 44.00     Types: Cigarettes   • Smokeless  tobacco: Never Used   • Tobacco comment: Began smoking at age 12.  Quit smoking on 2 occasions in the past for a total of 10 years.  Smoked 1 ppd for a 44 pack year history.   Vaping Use   • Vaping Use: Never used   Substance and Sexual Activity   • Alcohol use: Yes     Alcohol/week: 1.0 standard drink     Types: 1 Standard drinks or equivalent per week     Comment: 1 DRINK PER WEEK   • Drug use: No   • Sexual activity: Defer       Current Outpatient Medications on File Prior to Visit   Medication Sig Dispense Refill   • [DISCONTINUED] metFORMIN ER (GLUCOPHAGE-XR) 750 MG 24 hr tablet Take 1 tablet by mouth Daily With Breakfast. 30 tablet 2   • [DISCONTINUED] tamsulosin (FLOMAX) 0.4 MG capsule 24 hr capsule Take 1 capsule by mouth every night at bedtime. 90 capsule 1     No current facility-administered medications on file prior to visit.       Review of Systems   Constitutional: Negative.    HENT: Positive for nosebleeds.    Musculoskeletal: Positive for arthralgias.       Recent Results (from the past 4704 hour(s))   Comprehensive Metabolic Panel    Collection Time: 11/17/21 12:06 PM    Specimen: Blood    Blood  Release to mau   Result Value Ref Range    Glucose 269 (H) 65 - 99 mg/dL    BUN 19 8 - 27 mg/dL    Creatinine 0.99 0.76 - 1.27 mg/dL    eGFR Non African Am 80 >59 mL/min/1.73    eGFR African Am 93 >59 mL/min/1.73    BUN/Creatinine Ratio 19 10 - 24    Sodium 138 134 - 144 mmol/L    Potassium 4.2 3.5 - 5.2 mmol/L    Chloride 100 96 - 106 mmol/L    Total CO2 23 20 - 29 mmol/L    Calcium 9.5 8.6 - 10.2 mg/dL    Total Protein 6.8 6.0 - 8.5 g/dL    Albumin 4.4 3.8 - 4.8 g/dL    Globulin 2.4 1.5 - 4.5 g/dL    A/G Ratio 1.8 1.2 - 2.2    Total Bilirubin 0.3 0.0 - 1.2 mg/dL    Alkaline Phosphatase 112 44 - 121 IU/L    AST (SGOT) 11 0 - 40 IU/L    ALT (SGPT) 20 0 - 44 IU/L   Lipid Panel With LDL / HDL Ratio    Collection Time: 11/17/21 12:06 PM    Specimen: Blood    Blood  Release to mau   Result Value Ref Range     Total Cholesterol 231 (H) 100 - 199 mg/dL    Triglycerides 168 (H) 0 - 149 mg/dL    HDL Cholesterol 54 >39 mg/dL    VLDL Cholesterol Joe 30 5 - 40 mg/dL    LDL Chol Calc (NIH) 147 (H) 0 - 99 mg/dL    LDL/HDL RATIO 2.7 0.0 - 3.6 ratio   CBC (No Diff)    Collection Time: 11/17/21 12:06 PM    Specimen: Blood    Blood  Release to mau   Result Value Ref Range    WBC 9.0 3.4 - 10.8 x10E3/uL    RBC 5.26 4.14 - 5.80 x10E6/uL    Hemoglobin 16.6 13.0 - 17.7 g/dL    Hematocrit 48.6 37.5 - 51.0 %    MCV 92 79 - 97 fL    MCH 31.6 26.6 - 33.0 pg    MCHC 34.2 31.5 - 35.7 g/dL    RDW 12.3 11.6 - 15.4 %    Platelets 239 150 - 450 x10E3/uL   Hemoglobin A1c    Collection Time: 11/17/21 12:06 PM    Specimen: Blood    Blood  Release to mau   Result Value Ref Range    Hemoglobin A1C 7.2 (H) 4.8 - 5.6 %   PSA SCREENING    Collection Time: 11/17/21 12:06 PM    Specimen: Blood    Blood  Release to mau   Result Value Ref Range    PSA 4.3 (H) 0.0 - 4.0 ng/mL     Objective   Vitals:    02/24/22 1517   BP: 138/62   BP Location: Right arm   Patient Position: Sitting   Pulse: 94   Temp: 97.3 °F (36.3 °C)   SpO2: 95%   Weight: 80.5 kg (177 lb 6.4 oz)     Body mass index is 26.97 kg/m².  Physical Exam  Vitals and nursing note reviewed.   Constitutional:       General: He is not in acute distress.     Appearance: He is well-developed. He is not diaphoretic.   HENT:      Nose: Nose normal.   Cardiovascular:      Rate and Rhythm: Normal rate and regular rhythm.   Pulmonary:      Effort: Pulmonary effort is normal. No respiratory distress.      Breath sounds: Normal breath sounds. No wheezing.           Diagnoses and all orders for this visit:    1. Type 2 diabetes mellitus with hyperglycemia, without long-term current use of insulin (HCC) (Primary)  -     Hemoglobin A1c  -     Comprehensive Metabolic Panel  -     Lipid Panel With LDL / HDL Ratio  -     Microalbumin / Creatinine Urine Ratio - Urine, Clean Catch  -     Ambulatory Referral to  Podiatry  -     metFORMIN ER (GLUCOPHAGE-XR) 750 MG 24 hr tablet; Take 1 tablet by mouth Daily With Breakfast.  Dispense: 90 tablet; Refill: 3    2. Elevated PSA  -     Ambulatory Referral to Urology    3. Familial hyperlipidemia, high LDL  -     Comprehensive Metabolic Panel  -     Lipid Panel With LDL / HDL Ratio    4. Benign prostatic hyperplasia (BPH) with straining on urination  -     tamsulosin (FLOMAX) 0.4 MG capsule 24 hr capsule; Take 1 capsule by mouth every night at bedtime.  Dispense: 90 capsule; Refill: 3      Return in about 3 months (around 5/24/2022).    Extensive counseling patient and family his daughter is with him today regarding necessity of shingles shot, flu shot, coronavirus shot, but he refuses.  Patient was extensively counseled regarding coronavirus immunization and necessity of it.  However patient refuses at this time and is against coronavirus vaccination as well as other vaccinations.  Risks and benefits of the vaccination were discussed however patient is adamant and refuses all the vaccinations today.

## 2022-02-25 LAB
ALBUMIN SERPL-MCNC: 4.6 G/DL (ref 3.8–4.8)
ALBUMIN/CREAT UR: 14 MG/G CREAT (ref 0–29)
ALBUMIN/GLOB SERPL: 1.6 {RATIO} (ref 1.2–2.2)
ALP SERPL-CCNC: 111 IU/L (ref 44–121)
ALT SERPL-CCNC: 19 IU/L (ref 0–44)
AST SERPL-CCNC: 12 IU/L (ref 0–40)
BILIRUB SERPL-MCNC: 0.2 MG/DL (ref 0–1.2)
BUN SERPL-MCNC: 18 MG/DL (ref 8–27)
BUN/CREAT SERPL: 19 (ref 10–24)
CALCIUM SERPL-MCNC: 9.8 MG/DL (ref 8.6–10.2)
CHLORIDE SERPL-SCNC: 99 MMOL/L (ref 96–106)
CHOLEST SERPL-MCNC: 252 MG/DL (ref 100–199)
CO2 SERPL-SCNC: 23 MMOL/L (ref 20–29)
CREAT SERPL-MCNC: 0.97 MG/DL (ref 0.76–1.27)
CREAT UR-MCNC: 163 MG/DL
GLOBULIN SER CALC-MCNC: 2.9 G/DL (ref 1.5–4.5)
GLUCOSE SERPL-MCNC: 214 MG/DL (ref 65–99)
HBA1C MFR BLD: 7.5 % (ref 4.8–5.6)
HDLC SERPL-MCNC: 48 MG/DL
LDLC SERPL CALC-MCNC: 168 MG/DL (ref 0–99)
LDLC/HDLC SERPL: 3.5 RATIO (ref 0–3.6)
MICROALBUMIN UR-MCNC: 22.5 UG/ML
POTASSIUM SERPL-SCNC: 4.1 MMOL/L (ref 3.5–5.2)
PROT SERPL-MCNC: 7.5 G/DL (ref 6–8.5)
SODIUM SERPL-SCNC: 141 MMOL/L (ref 134–144)
TRIGL SERPL-MCNC: 197 MG/DL (ref 0–149)
VLDLC SERPL CALC-MCNC: 36 MG/DL (ref 5–40)

## 2022-03-26 DIAGNOSIS — E11.65 TYPE 2 DIABETES MELLITUS WITH HYPERGLYCEMIA, WITHOUT LONG-TERM CURRENT USE OF INSULIN: ICD-10-CM

## 2022-03-28 RX ORDER — METFORMIN HYDROCHLORIDE 750 MG/1
750 TABLET, EXTENDED RELEASE ORAL
Qty: 30 TABLET | OUTPATIENT
Start: 2022-03-28

## 2022-05-03 NOTE — ASSESSMENT & PLAN NOTE
BL hands - 4th digit  States was told will not have surgery until more contracted   
Declines cessation.  Aware of cancer risks, cataract risks.   
Diabetes is improving with lifestyle modifications.   Continue current treatment regimen.  Diabetes will be reassessed in 1 year.  
Stable   Check lab for ongoing therapeutic drug monitoring   
Normal vision: sees adequately in most situations; can see medication labels, newsprint

## 2022-05-26 ENCOUNTER — OFFICE VISIT (OUTPATIENT)
Dept: FAMILY MEDICINE CLINIC | Facility: CLINIC | Age: 67
End: 2022-05-26

## 2022-05-26 VITALS
BODY MASS INDEX: 26.58 KG/M2 | TEMPERATURE: 98 F | WEIGHT: 175.4 LBS | HEART RATE: 72 BPM | DIASTOLIC BLOOD PRESSURE: 75 MMHG | OXYGEN SATURATION: 96 % | SYSTOLIC BLOOD PRESSURE: 134 MMHG | HEIGHT: 68 IN

## 2022-05-26 DIAGNOSIS — R39.16 BENIGN PROSTATIC HYPERPLASIA (BPH) WITH STRAINING ON URINATION: Chronic | ICD-10-CM

## 2022-05-26 DIAGNOSIS — E11.65 TYPE 2 DIABETES MELLITUS WITH HYPERGLYCEMIA, WITHOUT LONG-TERM CURRENT USE OF INSULIN: Primary | ICD-10-CM

## 2022-05-26 DIAGNOSIS — E78.49 FAMILIAL HYPERLIPIDEMIA, HIGH LDL: ICD-10-CM

## 2022-05-26 DIAGNOSIS — R39.16 BENIGN PROSTATIC HYPERPLASIA (BPH) WITH STRAINING ON URINATION: ICD-10-CM

## 2022-05-26 DIAGNOSIS — N40.1 BENIGN PROSTATIC HYPERPLASIA (BPH) WITH STRAINING ON URINATION: Chronic | ICD-10-CM

## 2022-05-26 DIAGNOSIS — Z28.21 IMMUNIZATION REFUSED: ICD-10-CM

## 2022-05-26 DIAGNOSIS — Z91.199 MEDICALLY NONCOMPLIANT: ICD-10-CM

## 2022-05-26 DIAGNOSIS — N40.1 BENIGN PROSTATIC HYPERPLASIA (BPH) WITH STRAINING ON URINATION: ICD-10-CM

## 2022-05-26 PROCEDURE — 99214 OFFICE O/P EST MOD 30 MIN: CPT | Performed by: FAMILY MEDICINE

## 2022-05-26 RX ORDER — METFORMIN HYDROCHLORIDE 750 MG/1
750 TABLET, EXTENDED RELEASE ORAL 2 TIMES DAILY
Qty: 180 TABLET | Refills: 3 | Status: SHIPPED | OUTPATIENT
Start: 2022-05-26

## 2022-05-26 RX ORDER — CIPROFLOXACIN 500 MG/1
TABLET, FILM COATED ORAL
COMMUNITY
Start: 2022-04-15 | End: 2022-05-26

## 2022-05-26 RX ORDER — ROSUVASTATIN CALCIUM 10 MG/1
10 TABLET, COATED ORAL DAILY
Qty: 90 TABLET | Refills: 3 | Status: SHIPPED | OUTPATIENT
Start: 2022-05-26 | End: 2022-12-08 | Stop reason: SDUPTHER

## 2022-05-26 RX ORDER — TAMSULOSIN HYDROCHLORIDE 0.4 MG/1
1 CAPSULE ORAL
Qty: 90 CAPSULE | Refills: 3 | OUTPATIENT
Start: 2022-05-26

## 2022-05-26 NOTE — PROGRESS NOTES
Subjective   Quan Holloway is a 66 y.o. male.     Chief Complaint   Patient presents with   • Diabetes       History of Present Illness   Diabetes follow-up.  Patient was not taking his metformin after his first month supply he did not know that he needed a refill even though he was given a year refills.  PAD stable.  Prostate disease is stable.  He is seeing urologist.  Blood pressure is stable at home at  124/70  Hyperlipidemia is not controlled and he is not on any cholesterol medications at this time counseling provided and will start on statin  The following portions of the patient's history were reviewed and updated as appropriate: allergies, current medications, past family history, past medical history, past social history, past surgical history and problem list.    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       Past Surgical History:   Procedure Laterality Date   • APPENDECTOMY  2002   • COLONOSCOPY W/ POLYPECTOMY  07/20/2018     3 polyps 3-5 mm   • LUMBAR DISCECTOMY  02/12/2018    , L3-4   • SHOULDER SURGERY Right    • SHOULDER SURGERY Left     Dr.Kitty Vann       Family History   Problem Relation Age of Onset   • Brain cancer Mother    • Prostate cancer Father    • Other Father         PAD   • Stroke Father    • Stomach cancer Paternal Grandfather        Social History     Socioeconomic History   • Marital status:    • Number of children: 2   Tobacco Use   • Smoking status: Current Every Day Smoker     Packs/day: 1.00     Years: 44.00     Pack years: 44.00     Types: Cigarettes    • Smokeless tobacco: Never Used   • Tobacco comment: Began smoking at age 12.  Quit smoking on 2 occasions in the past for a total of 10 years.  Smoked 1 ppd for a 44 pack year history.   Vaping Use   • Vaping Use: Never used   Substance and Sexual Activity   • Alcohol use: Yes     Alcohol/week: 1.0 standard drink     Types: 1 Standard drinks or equivalent per week     Comment: 1 DRINK PER WEEK   • Drug use: No   • Sexual activity: Defer       Current Outpatient Medications on File Prior to Visit   Medication Sig Dispense Refill   • tamsulosin (FLOMAX) 0.4 MG capsule 24 hr capsule Take 1 capsule by mouth every night at bedtime. 90 capsule 3   • [DISCONTINUED] ciprofloxacin (CIPRO) 500 MG tablet TAKE 1 TABLET BY MOUTH TWICE DAILY. START TAKING THIS MEDICATION THE DAY BEFORE YOUR SCHEDULED PROCEDURE     • [DISCONTINUED] metFORMIN ER (GLUCOPHAGE-XR) 750 MG 24 hr tablet Take 1 tablet by mouth Daily With Breakfast. 90 tablet 3     No current facility-administered medications on file prior to visit.       Review of Systems   Constitutional: Negative.        Recent Results (from the past 4704 hour(s))   Comprehensive Metabolic Panel    Collection Time: 11/17/21 12:06 PM    Specimen: Blood    Blood  Release to mau   Result Value Ref Range    Glucose 269 (H) 65 - 99 mg/dL    BUN 19 8 - 27 mg/dL    Creatinine 0.99 0.76 - 1.27 mg/dL    eGFR Non African Am 80 >59 mL/min/1.73    eGFR African Am 93 >59 mL/min/1.73    BUN/Creatinine Ratio 19 10 - 24    Sodium 138 134 - 144 mmol/L    Potassium 4.2 3.5 - 5.2 mmol/L    Chloride 100 96 - 106 mmol/L    Total CO2 23 20 - 29 mmol/L    Calcium 9.5 8.6 - 10.2 mg/dL    Total Protein 6.8 6.0 - 8.5 g/dL    Albumin 4.4 3.8 - 4.8 g/dL    Globulin 2.4 1.5 - 4.5 g/dL    A/G Ratio 1.8 1.2 - 2.2    Total Bilirubin 0.3 0.0 - 1.2 mg/dL    Alkaline Phosphatase 112 44 - 121 IU/L    AST (SGOT) 11 0 - 40 IU/L    ALT (SGPT) 20 0 - 44 IU/L   Lipid Panel With LDL / HDL Ratio    Collection Time: 11/17/21  12:06 PM    Specimen: Blood    Blood  Release to mau   Result Value Ref Range    Total Cholesterol 231 (H) 100 - 199 mg/dL    Triglycerides 168 (H) 0 - 149 mg/dL    HDL Cholesterol 54 >39 mg/dL    VLDL Cholesterol Joe 30 5 - 40 mg/dL    LDL Chol Calc (NIH) 147 (H) 0 - 99 mg/dL    LDL/HDL RATIO 2.7 0.0 - 3.6 ratio   CBC (No Diff)    Collection Time: 11/17/21 12:06 PM    Specimen: Blood    Blood  Release to mau   Result Value Ref Range    WBC 9.0 3.4 - 10.8 x10E3/uL    RBC 5.26 4.14 - 5.80 x10E6/uL    Hemoglobin 16.6 13.0 - 17.7 g/dL    Hematocrit 48.6 37.5 - 51.0 %    MCV 92 79 - 97 fL    MCH 31.6 26.6 - 33.0 pg    MCHC 34.2 31.5 - 35.7 g/dL    RDW 12.3 11.6 - 15.4 %    Platelets 239 150 - 450 x10E3/uL   Hemoglobin A1c    Collection Time: 11/17/21 12:06 PM    Specimen: Blood    Blood  Release to mau   Result Value Ref Range    Hemoglobin A1C 7.2 (H) 4.8 - 5.6 %   PSA SCREENING    Collection Time: 11/17/21 12:06 PM    Specimen: Blood    Blood  Release to mau   Result Value Ref Range    PSA 4.3 (H) 0.0 - 4.0 ng/mL   Hemoglobin A1c    Collection Time: 02/24/22  4:01 PM    Specimen: Blood   Result Value Ref Range    Hemoglobin A1C 7.5 (H) 4.8 - 5.6 %   Comprehensive Metabolic Panel    Collection Time: 02/24/22  4:01 PM    Specimen: Blood   Result Value Ref Range    Glucose 214 (H) 65 - 99 mg/dL    BUN 18 8 - 27 mg/dL    Creatinine 0.97 0.76 - 1.27 mg/dL    eGFR Non African Am 82 >59 mL/min/1.73    eGFR African Am 95 >59 mL/min/1.73    BUN/Creatinine Ratio 19 10 - 24    Sodium 141 134 - 144 mmol/L    Potassium 4.1 3.5 - 5.2 mmol/L    Chloride 99 96 - 106 mmol/L    Total CO2 23 20 - 29 mmol/L    Calcium 9.8 8.6 - 10.2 mg/dL    Total Protein 7.5 6.0 - 8.5 g/dL    Albumin 4.6 3.8 - 4.8 g/dL    Globulin 2.9 1.5 - 4.5 g/dL    A/G Ratio 1.6 1.2 - 2.2    Total Bilirubin 0.2 0.0 - 1.2 mg/dL    Alkaline Phosphatase 111 44 - 121 IU/L    AST (SGOT) 12 0 - 40 IU/L    ALT (SGPT) 19 0 - 44 IU/L   Lipid Panel With LDL / HDL Ratio     "Collection Time: 02/24/22  4:01 PM    Specimen: Blood   Result Value Ref Range    Total Cholesterol 252 (H) 100 - 199 mg/dL    Triglycerides 197 (H) 0 - 149 mg/dL    HDL Cholesterol 48 >39 mg/dL    VLDL Cholesterol Joe 36 5 - 40 mg/dL    LDL Chol Calc (NIH) 168 (H) 0 - 99 mg/dL    LDL/HDL RATIO 3.5 0.0 - 3.6 ratio   Microalbumin / Creatinine Urine Ratio - Urine, Clean Catch    Collection Time: 02/24/22  4:01 PM    Specimen: Urine, Clean Catch   Result Value Ref Range    Creatinine, Urine 163.0 Not Estab. mg/dL    Microalbumin, Urine 22.5 Not Estab. ug/mL    Microalbumin/Creatinine Ratio 14 0 - 29 mg/g creat     Objective   Vitals:    05/26/22 1139   BP: 134/75   BP Location: Left arm   Patient Position: Sitting   Pulse: 72   Temp: 98 °F (36.7 °C)   SpO2: 96%   Weight: 79.6 kg (175 lb 6.4 oz)   Height: 172.7 cm (67.99\")     Body mass index is 26.68 kg/m².  Physical Exam  Vitals and nursing note reviewed.   Constitutional:       General: He is not in acute distress.     Appearance: He is well-developed. He is not diaphoretic.   Cardiovascular:      Rate and Rhythm: Normal rate and regular rhythm.   Pulmonary:      Effort: Pulmonary effort is normal. No respiratory distress.      Breath sounds: Normal breath sounds. No wheezing.           Diagnoses and all orders for this visit:    1. Type 2 diabetes mellitus with hyperglycemia, without long-term current use of insulin (HCC) (Primary)  -     metFORMIN ER (GLUCOPHAGE-XR) 750 MG 24 hr tablet; Take 1 tablet by mouth 2 (Two) Times a Day.  Dispense: 180 tablet; Refill: 3  -     Hemoglobin A1c  -     Comprehensive Metabolic Panel  -     Lipid Panel With LDL / HDL Ratio    2. Familial hyperlipidemia, high LDL  -     Comprehensive Metabolic Panel  -     Lipid Panel With LDL / HDL Ratio  -     rosuvastatin (Crestor) 10 MG tablet; Take 1 tablet by mouth Daily.  Dispense: 90 tablet; Refill: 3    3. Immunization refused    4. Medically noncompliant    5. Benign prostatic " hyperplasia (BPH) with straining on urination      Return in about 3 months (around 8/26/2022) for DIABETES, HYPERLIPIDEMIA.

## 2022-05-27 LAB
ALBUMIN SERPL-MCNC: 4.7 G/DL (ref 3.8–4.8)
ALBUMIN/GLOB SERPL: 1.6 {RATIO} (ref 1.2–2.2)
ALP SERPL-CCNC: 122 IU/L (ref 44–121)
ALT SERPL-CCNC: 21 IU/L (ref 0–44)
AST SERPL-CCNC: 18 IU/L (ref 0–40)
BILIRUB SERPL-MCNC: 0.5 MG/DL (ref 0–1.2)
BUN SERPL-MCNC: 13 MG/DL (ref 8–27)
BUN/CREAT SERPL: 12 (ref 10–24)
CALCIUM SERPL-MCNC: 9.8 MG/DL (ref 8.6–10.2)
CHLORIDE SERPL-SCNC: 101 MMOL/L (ref 96–106)
CHOLEST SERPL-MCNC: 276 MG/DL (ref 100–199)
CO2 SERPL-SCNC: 23 MMOL/L (ref 20–29)
CREAT SERPL-MCNC: 1.11 MG/DL (ref 0.76–1.27)
EGFRCR SERPLBLD CKD-EPI 2021: 74 ML/MIN/1.73
GLOBULIN SER CALC-MCNC: 3 G/DL (ref 1.5–4.5)
GLUCOSE SERPL-MCNC: 153 MG/DL (ref 65–99)
HBA1C MFR BLD: 7.8 % (ref 4.8–5.6)
HDLC SERPL-MCNC: 59 MG/DL
LDLC SERPL CALC-MCNC: 194 MG/DL (ref 0–99)
LDLC/HDLC SERPL: 3.3 RATIO (ref 0–3.6)
POTASSIUM SERPL-SCNC: 4.9 MMOL/L (ref 3.5–5.2)
PROT SERPL-MCNC: 7.7 G/DL (ref 6–8.5)
SODIUM SERPL-SCNC: 140 MMOL/L (ref 134–144)
TRIGL SERPL-MCNC: 130 MG/DL (ref 0–149)
VLDLC SERPL CALC-MCNC: 23 MG/DL (ref 5–40)

## 2022-09-08 ENCOUNTER — OFFICE VISIT (OUTPATIENT)
Dept: FAMILY MEDICINE CLINIC | Facility: CLINIC | Age: 67
End: 2022-09-08

## 2022-09-08 VITALS
TEMPERATURE: 98 F | WEIGHT: 170 LBS | HEIGHT: 68 IN | DIASTOLIC BLOOD PRESSURE: 77 MMHG | OXYGEN SATURATION: 94 % | HEART RATE: 83 BPM | SYSTOLIC BLOOD PRESSURE: 131 MMHG | BODY MASS INDEX: 25.76 KG/M2

## 2022-09-08 DIAGNOSIS — Z28.21 IMMUNIZATION REFUSED: ICD-10-CM

## 2022-09-08 DIAGNOSIS — F17.210 CIGARETTE SMOKER: ICD-10-CM

## 2022-09-08 DIAGNOSIS — E78.49 FAMILIAL HYPERLIPIDEMIA, HIGH LDL: ICD-10-CM

## 2022-09-08 DIAGNOSIS — E11.65 TYPE 2 DIABETES MELLITUS WITH HYPERGLYCEMIA, WITHOUT LONG-TERM CURRENT USE OF INSULIN: Primary | ICD-10-CM

## 2022-09-08 DIAGNOSIS — Z91.199 MEDICALLY NONCOMPLIANT: ICD-10-CM

## 2022-09-08 DIAGNOSIS — Z71.6 ENCOUNTER FOR SMOKING CESSATION COUNSELING: ICD-10-CM

## 2022-09-08 DIAGNOSIS — R39.16 BENIGN PROSTATIC HYPERPLASIA (BPH) WITH STRAINING ON URINATION: Chronic | ICD-10-CM

## 2022-09-08 DIAGNOSIS — F17.200 TOBACCO USE DISORDER: Chronic | ICD-10-CM

## 2022-09-08 DIAGNOSIS — N40.1 BENIGN PROSTATIC HYPERPLASIA (BPH) WITH STRAINING ON URINATION: Chronic | ICD-10-CM

## 2022-09-08 PROCEDURE — 99214 OFFICE O/P EST MOD 30 MIN: CPT | Performed by: FAMILY MEDICINE

## 2022-09-08 NOTE — PROGRESS NOTES
Subjective   Quan Holloway is a 67 y.o. male.     Chief Complaint   Patient presents with   • Diabetes   • Hyperlipidemia       History of Present Illness   Patient is here to follow-up on his diabetes and hyperlipidemia  This is not under optimal control.  Patient is also not checking his blood sugar at home and the status of ambulatory blood sugars is unknown.  He is also stopped all his medications at this time for absolutely unknown reason.  Hyperlipidemia is out of control.  His last labs are worse than the labs before.  He stopped taking all his medications since then as well.    He refuses any of the health maintenance immunizations or procedures.    The following portions of the patient's history were reviewed and updated as appropriate: allergies, current medications, past family history, past medical history, past social history, past surgical history and problem list.    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       Past Surgical History:   Procedure Laterality Date   • APPENDECTOMY  2002   • COLONOSCOPY W/ POLYPECTOMY  07/20/2018     3 polyps 3-5 mm   • LUMBAR DISCECTOMY  02/12/2018    , L3-4   • SHOULDER SURGERY Right    • SHOULDER SURGERY Left     Dr.Kitty Vann       Family History   Problem Relation Age of Onset   • Brain cancer Mother    • Prostate cancer Father    • Other Father         PAD   • Stroke Father    • Stomach cancer Paternal Grandfather        Social History     Socioeconomic History   • Marital status:    • Number of children: 2   Tobacco Use   • Smoking  status: Current Every Day Smoker     Packs/day: 1.00     Years: 44.00     Pack years: 44.00     Types: Cigarettes   • Smokeless tobacco: Never Used   • Tobacco comment: Began smoking at age 12.  Quit smoking on 2 occasions in the past for a total of 10 years.  Smoked 1 ppd for a 44 pack year history.   Vaping Use   • Vaping Use: Never used   Substance and Sexual Activity   • Alcohol use: Yes     Alcohol/week: 2.0 standard drinks     Types: 2 Cans of beer per week     Comment: 1 DRINK PER WEEK   • Drug use: No   • Sexual activity: Defer       Current Outpatient Medications on File Prior to Visit   Medication Sig Dispense Refill   • metFORMIN ER (GLUCOPHAGE-XR) 750 MG 24 hr tablet Take 1 tablet by mouth 2 (Two) Times a Day. 180 tablet 3   • rosuvastatin (Crestor) 10 MG tablet Take 1 tablet by mouth Daily. 90 tablet 3   • tamsulosin (FLOMAX) 0.4 MG capsule 24 hr capsule Take 1 capsule by mouth every night at bedtime. 90 capsule 3     No current facility-administered medications on file prior to visit.       Review of Systems   Constitutional: Negative.        Recent Results (from the past 4704 hour(s))   Hemoglobin A1c    Collection Time: 02/24/22  4:01 PM    Specimen: Blood   Result Value Ref Range    Hemoglobin A1C 7.5 (H) 4.8 - 5.6 %   Comprehensive Metabolic Panel    Collection Time: 02/24/22  4:01 PM    Specimen: Blood   Result Value Ref Range    Glucose 214 (H) 65 - 99 mg/dL    BUN 18 8 - 27 mg/dL    Creatinine 0.97 0.76 - 1.27 mg/dL    eGFR Non African Am 82 >59 mL/min/1.73    eGFR African Am 95 >59 mL/min/1.73    BUN/Creatinine Ratio 19 10 - 24    Sodium 141 134 - 144 mmol/L    Potassium 4.1 3.5 - 5.2 mmol/L    Chloride 99 96 - 106 mmol/L    Total CO2 23 20 - 29 mmol/L    Calcium 9.8 8.6 - 10.2 mg/dL    Total Protein 7.5 6.0 - 8.5 g/dL    Albumin 4.6 3.8 - 4.8 g/dL    Globulin 2.9 1.5 - 4.5 g/dL    A/G Ratio 1.6 1.2 - 2.2    Total Bilirubin 0.2 0.0 - 1.2 mg/dL    Alkaline Phosphatase 111 44 - 121 IU/L    AST  (SGOT) 12 0 - 40 IU/L    ALT (SGPT) 19 0 - 44 IU/L   Lipid Panel With LDL / HDL Ratio    Collection Time: 02/24/22  4:01 PM    Specimen: Blood   Result Value Ref Range    Total Cholesterol 252 (H) 100 - 199 mg/dL    Triglycerides 197 (H) 0 - 149 mg/dL    HDL Cholesterol 48 >39 mg/dL    VLDL Cholesterol Joe 36 5 - 40 mg/dL    LDL Chol Calc (NIH) 168 (H) 0 - 99 mg/dL    LDL/HDL RATIO 3.5 0.0 - 3.6 ratio   Microalbumin / Creatinine Urine Ratio - Urine, Clean Catch    Collection Time: 02/24/22  4:01 PM    Specimen: Urine, Clean Catch   Result Value Ref Range    Creatinine, Urine 163.0 Not Estab. mg/dL    Microalbumin, Urine 22.5 Not Estab. ug/mL    Microalbumin/Creatinine Ratio 14 0 - 29 mg/g creat   Hemoglobin A1c    Collection Time: 05/26/22 12:14 PM    Specimen: Blood   Result Value Ref Range    Hemoglobin A1C 7.8 (H) 4.8 - 5.6 %   Comprehensive Metabolic Panel    Collection Time: 05/26/22 12:14 PM    Specimen: Blood   Result Value Ref Range    Glucose 153 (H) 65 - 99 mg/dL    BUN 13 8 - 27 mg/dL    Creatinine 1.11 0.76 - 1.27 mg/dL    EGFR Result 74 >59 mL/min/1.73    BUN/Creatinine Ratio 12 10 - 24    Sodium 140 134 - 144 mmol/L    Potassium 4.9 3.5 - 5.2 mmol/L    Chloride 101 96 - 106 mmol/L    Total CO2 23 20 - 29 mmol/L    Calcium 9.8 8.6 - 10.2 mg/dL    Total Protein 7.7 6.0 - 8.5 g/dL    Albumin 4.7 3.8 - 4.8 g/dL    Globulin 3.0 1.5 - 4.5 g/dL    A/G Ratio 1.6 1.2 - 2.2    Total Bilirubin 0.5 0.0 - 1.2 mg/dL    Alkaline Phosphatase 122 (H) 44 - 121 IU/L    AST (SGOT) 18 0 - 40 IU/L    ALT (SGPT) 21 0 - 44 IU/L   Lipid Panel With LDL / HDL Ratio    Collection Time: 05/26/22 12:14 PM    Specimen: Blood   Result Value Ref Range    Total Cholesterol 276 (H) 100 - 199 mg/dL    Triglycerides 130 0 - 149 mg/dL    HDL Cholesterol 59 >39 mg/dL    VLDL Cholesterol Joe 23 5 - 40 mg/dL    LDL Chol Calc ([facility]) 194 (H) 0 - 99 mg/dL    LDL/HDL RATIO 3.3 0.0 - 3.6 ratio     Objective   Vitals:    09/08/22 1028   BP: 131/77  "  BP Location: Right arm   Patient Position: Sitting   Pulse: 83   Temp: 98 °F (36.7 °C)   SpO2: 94%   Weight: 77.1 kg (170 lb)   Height: 172.7 cm (67.99\")     Body mass index is 25.85 kg/m².  Physical Exam  Vitals and nursing note reviewed.   Constitutional:       General: He is not in acute distress.     Appearance: He is well-developed. He is not diaphoretic.   Cardiovascular:      Rate and Rhythm: Normal rate and regular rhythm.   Pulmonary:      Effort: Pulmonary effort is normal. No respiratory distress.      Breath sounds: Normal breath sounds. No wheezing.           Diagnoses and all orders for this visit:    1. Type 2 diabetes mellitus with hyperglycemia, without long-term current use of insulin (HCC) (Primary)  -     Hemoglobin A1c  -     Comprehensive Metabolic Panel  -     Lipid Panel With LDL / HDL Ratio    2. Familial hyperlipidemia, high LDL  -     Comprehensive Metabolic Panel  -     Lipid Panel With LDL / HDL Ratio    3. Medically noncompliant  Comments:  stopped all medications    4. Immunization refused    5. Cigarette smoker  -      CT Chest Low Dose Cancer Screening WO; Future    6. Benign prostatic hyperplasia (BPH) with straining on urination    7. Tobacco use disorder    8. Encounter for smoking cessation counseling    Patient gave me permission to talk to his daughter tomorrow but she will his lab results.  I will make her aware of his noncompliance with medical treatment and health maintenance procedures      Patient was extensively counseled regarding coronavirus immunization and necessity of it.  However patient refuses at this time and is against coronavirus vaccination as well as other vaccinations.  Risks and benefits of the vaccination were discussed however patient is adamant and refuses all the vaccinations today.    Return in about 3 months (around 12/8/2022) for DIABETES.    Answers for HPI/ROS submitted by the patient on 9/5/2022  What is the primary reason for your visit?: " Physical

## 2022-09-09 LAB
ALBUMIN SERPL-MCNC: 5 G/DL (ref 3.5–5.2)
ALBUMIN/GLOB SERPL: 2.3 G/DL
ALP SERPL-CCNC: 93 U/L (ref 39–117)
ALT SERPL-CCNC: 11 U/L (ref 1–41)
AST SERPL-CCNC: 11 U/L (ref 1–40)
BILIRUB SERPL-MCNC: 0.6 MG/DL (ref 0–1.2)
BUN SERPL-MCNC: 14 MG/DL (ref 8–23)
BUN/CREAT SERPL: 15.4 (ref 7–25)
CALCIUM SERPL-MCNC: 9.6 MG/DL (ref 8.6–10.5)
CHLORIDE SERPL-SCNC: 102 MMOL/L (ref 98–107)
CHOLEST SERPL-MCNC: 267 MG/DL (ref 0–200)
CO2 SERPL-SCNC: 26 MMOL/L (ref 22–29)
CREAT SERPL-MCNC: 0.91 MG/DL (ref 0.76–1.27)
EGFRCR-CYS SERPLBLD CKD-EPI 2021: 93.5 ML/MIN/1.73
GLOBULIN SER CALC-MCNC: 2.2 GM/DL
GLUCOSE SERPL-MCNC: 139 MG/DL (ref 65–99)
HBA1C MFR BLD: 7.4 % (ref 4.8–5.6)
HDLC SERPL-MCNC: 54 MG/DL (ref 40–60)
LDLC SERPL CALC-MCNC: 197 MG/DL (ref 0–100)
LDLC/HDLC SERPL: 3.6 {RATIO}
POTASSIUM SERPL-SCNC: 4.5 MMOL/L (ref 3.5–5.2)
PROT SERPL-MCNC: 7.2 G/DL (ref 6–8.5)
SODIUM SERPL-SCNC: 141 MMOL/L (ref 136–145)
TRIGL SERPL-MCNC: 93 MG/DL (ref 0–150)
VLDLC SERPL CALC-MCNC: 16 MG/DL (ref 5–40)

## 2022-11-11 DIAGNOSIS — R39.16 BENIGN PROSTATIC HYPERPLASIA (BPH) WITH STRAINING ON URINATION: ICD-10-CM

## 2022-11-11 DIAGNOSIS — N40.1 BENIGN PROSTATIC HYPERPLASIA (BPH) WITH STRAINING ON URINATION: ICD-10-CM

## 2022-11-11 RX ORDER — TAMSULOSIN HYDROCHLORIDE 0.4 MG/1
1 CAPSULE ORAL
Qty: 30 CAPSULE | OUTPATIENT
Start: 2022-11-11

## 2022-11-14 RX ORDER — TAMSULOSIN HYDROCHLORIDE 0.4 MG/1
1 CAPSULE ORAL
Qty: 30 CAPSULE | OUTPATIENT
Start: 2022-11-14

## 2022-12-08 ENCOUNTER — OFFICE VISIT (OUTPATIENT)
Dept: FAMILY MEDICINE CLINIC | Facility: CLINIC | Age: 67
End: 2022-12-08

## 2022-12-08 VITALS
HEIGHT: 68 IN | HEART RATE: 74 BPM | OXYGEN SATURATION: 95 % | TEMPERATURE: 97.8 F | BODY MASS INDEX: 26.13 KG/M2 | SYSTOLIC BLOOD PRESSURE: 145 MMHG | WEIGHT: 172.4 LBS | DIASTOLIC BLOOD PRESSURE: 80 MMHG

## 2022-12-08 DIAGNOSIS — Z91.199 MEDICALLY NONCOMPLIANT: ICD-10-CM

## 2022-12-08 DIAGNOSIS — E78.49 FAMILIAL HYPERLIPIDEMIA, HIGH LDL: ICD-10-CM

## 2022-12-08 DIAGNOSIS — Z72.0 DECLINED SMOKING CESSATION: ICD-10-CM

## 2022-12-08 DIAGNOSIS — N40.1 BENIGN PROSTATIC HYPERPLASIA (BPH) WITH STRAINING ON URINATION: ICD-10-CM

## 2022-12-08 DIAGNOSIS — E11.65 TYPE 2 DIABETES MELLITUS WITH HYPERGLYCEMIA, WITHOUT LONG-TERM CURRENT USE OF INSULIN: Primary | ICD-10-CM

## 2022-12-08 DIAGNOSIS — R39.16 BENIGN PROSTATIC HYPERPLASIA (BPH) WITH STRAINING ON URINATION: ICD-10-CM

## 2022-12-08 DIAGNOSIS — I10 PRIMARY HYPERTENSION: ICD-10-CM

## 2022-12-08 DIAGNOSIS — R06.02 SHORTNESS OF BREATH: ICD-10-CM

## 2022-12-08 PROCEDURE — 99214 OFFICE O/P EST MOD 30 MIN: CPT | Performed by: FAMILY MEDICINE

## 2022-12-08 RX ORDER — LISINOPRIL 10 MG/1
10 TABLET ORAL DAILY
Qty: 30 TABLET | Refills: 11 | Status: SHIPPED | OUTPATIENT
Start: 2022-12-08

## 2022-12-08 RX ORDER — ROSUVASTATIN CALCIUM 10 MG/1
10 TABLET, COATED ORAL DAILY
Qty: 90 TABLET | Refills: 3 | Status: SHIPPED | OUTPATIENT
Start: 2022-12-08

## 2022-12-08 RX ORDER — TAMSULOSIN HYDROCHLORIDE 0.4 MG/1
1 CAPSULE ORAL
Qty: 90 CAPSULE | Refills: 3 | Status: SHIPPED | OUTPATIENT
Start: 2022-12-08

## 2022-12-08 NOTE — PROGRESS NOTES
Subjective   Quan Holloway is a 67 y.o. male.     Chief Complaint   Patient presents with   • Diabetes       History of Present Illness     Diabetes follow-up.  Last A1c 7.4 from 7.8.  Patient is refusing to take any medications.  He also is not checking his blood sugars.  Hypertension newly diagnosed.  Will start ACE inhibitor today.  Hyperlipidemia uncontrolled.  Refuses to take any medications.  However patient is complaining on dyspnea and is a over 30 pack year smoker.  She is refusing smoking cessation at this time.    The following portions of the patient's history were reviewed and updated as appropriate: allergies, current medications, past family history, past medical history, past social history, past surgical history and problem list.    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       Past Surgical History:   Procedure Laterality Date   • APPENDECTOMY  2002   • COLONOSCOPY W/ POLYPECTOMY  07/20/2018     3 polyps 3-5 mm   • LUMBAR DISCECTOMY  02/12/2018    , L3-4   • SHOULDER SURGERY Right    • SHOULDER SURGERY Left     Dr.Kitty Vann       Family History   Problem Relation Age of Onset   • Brain cancer Mother    • Prostate cancer Father    • Other Father         PAD   • Stroke Father    • Stomach cancer Paternal Grandfather        Social History     Socioeconomic History   • Marital status:    • Number of children: 2   Tobacco Use   • Smoking status: Every Day     Packs/day: 1.00     Years: 44.00     Pack years: 44.00     Types: Cigarettes   • Smokeless tobacco: Never   •  Tobacco comments:     Began smoking at age 12.  Quit smoking on 2 occasions in the past for a total of 10 years.  Smoked 1 ppd for a 44 pack year history.   Vaping Use   • Vaping Use: Never used   Substance and Sexual Activity   • Alcohol use: Yes     Alcohol/week: 2.0 standard drinks     Types: 2 Cans of beer per week     Comment: 1 DRINK PER WEEK   • Drug use: No   • Sexual activity: Defer       Current Outpatient Medications on File Prior to Visit   Medication Sig Dispense Refill   • metFORMIN ER (GLUCOPHAGE-XR) 750 MG 24 hr tablet Take 1 tablet by mouth 2 (Two) Times a Day. 180 tablet 3   • [DISCONTINUED] rosuvastatin (Crestor) 10 MG tablet Take 1 tablet by mouth Daily. 90 tablet 3   • [DISCONTINUED] tamsulosin (FLOMAX) 0.4 MG capsule 24 hr capsule Take 1 capsule by mouth every night at bedtime. 90 capsule 3     No current facility-administered medications on file prior to visit.       Review of Systems   Constitutional: Negative.    Respiratory: Positive for shortness of breath.        Recent Results (from the past 4704 hour(s))   Hemoglobin A1c    Collection Time: 05/26/22 12:14 PM    Specimen: Blood   Result Value Ref Range    Hemoglobin A1C 7.8 (H) 4.8 - 5.6 %   Comprehensive Metabolic Panel    Collection Time: 05/26/22 12:14 PM    Specimen: Blood   Result Value Ref Range    Glucose 153 (H) 65 - 99 mg/dL    BUN 13 8 - 27 mg/dL    Creatinine 1.11 0.76 - 1.27 mg/dL    EGFR Result 74 >59 mL/min/1.73    BUN/Creatinine Ratio 12 10 - 24    Sodium 140 134 - 144 mmol/L    Potassium 4.9 3.5 - 5.2 mmol/L    Chloride 101 96 - 106 mmol/L    Total CO2 23 20 - 29 mmol/L    Calcium 9.8 8.6 - 10.2 mg/dL    Total Protein 7.7 6.0 - 8.5 g/dL    Albumin 4.7 3.8 - 4.8 g/dL    Globulin 3.0 1.5 - 4.5 g/dL    A/G Ratio 1.6 1.2 - 2.2    Total Bilirubin 0.5 0.0 - 1.2 mg/dL    Alkaline Phosphatase 122 (H) 44 - 121 IU/L    AST (SGOT) 18 0 - 40 IU/L    ALT (SGPT) 21 0 - 44 IU/L   Lipid Panel With LDL / HDL Ratio    Collection Time:  "05/26/22 12:14 PM    Specimen: Blood   Result Value Ref Range    Total Cholesterol 276 (H) 100 - 199 mg/dL    Triglycerides 130 0 - 149 mg/dL    HDL Cholesterol 59 >39 mg/dL    VLDL Cholesterol Joe 23 5 - 40 mg/dL    LDL Chol Calc (NIH) 194 (H) 0 - 99 mg/dL    LDL/HDL RATIO 3.3 0.0 - 3.6 ratio   Hemoglobin A1c    Collection Time: 09/08/22 11:23 AM    Specimen: Blood   Result Value Ref Range    Hemoglobin A1C 7.40 (H) 4.80 - 5.60 %   Comprehensive Metabolic Panel    Collection Time: 09/08/22 11:23 AM    Specimen: Blood   Result Value Ref Range    Glucose 139 (H) 65 - 99 mg/dL    BUN 14 8 - 23 mg/dL    Creatinine 0.91 0.76 - 1.27 mg/dL    EGFR Result 93.5 >60.0 mL/min/1.73    BUN/Creatinine Ratio 15.4 7.0 - 25.0    Sodium 141 136 - 145 mmol/L    Potassium 4.5 3.5 - 5.2 mmol/L    Chloride 102 98 - 107 mmol/L    Total CO2 26.0 22.0 - 29.0 mmol/L    Calcium 9.6 8.6 - 10.5 mg/dL    Total Protein 7.2 6.0 - 8.5 g/dL    Albumin 5.00 3.50 - 5.20 g/dL    Globulin 2.2 gm/dL    A/G Ratio 2.3 g/dL    Total Bilirubin 0.6 0.0 - 1.2 mg/dL    Alkaline Phosphatase 93 39 - 117 U/L    AST (SGOT) 11 1 - 40 U/L    ALT (SGPT) 11 1 - 41 U/L   Lipid Panel With LDL / HDL Ratio    Collection Time: 09/08/22 11:23 AM    Specimen: Blood   Result Value Ref Range    Total Cholesterol 267 (H) 0 - 200 mg/dL    Triglycerides 93 0 - 150 mg/dL    HDL Cholesterol 54 40 - 60 mg/dL    VLDL Cholesterol Joe 16 5 - 40 mg/dL    LDL Chol Calc (NIH) 197 (H) 0 - 100 mg/dL    LDL/HDL RATIO 3.60      Objective   Vitals:    12/08/22 1015   BP: 145/80   BP Location: Left arm   Patient Position: Sitting   Pulse: 74   Temp: 97.8 °F (36.6 °C)   SpO2: 95%   Weight: 78.2 kg (172 lb 6.4 oz)   Height: 172.7 cm (67.99\")     Body mass index is 26.22 kg/m².  Physical Exam  Vitals and nursing note reviewed.   Constitutional:       General: He is not in acute distress.     Appearance: He is well-developed. He is not diaphoretic.   Cardiovascular:      Rate and Rhythm: Normal " rate and regular rhythm.   Pulmonary:      Effort: Pulmonary effort is normal. No respiratory distress.      Breath sounds: Normal breath sounds. No wheezing.           Diagnoses and all orders for this visit:    1. Type 2 diabetes mellitus with hyperglycemia, without long-term current use of insulin (HCC) (Primary)  Comments:  refuses to take meds  Orders:  -     Hemoglobin A1c  -     Comprehensive Metabolic Panel  -     Lipid Panel With LDL / HDL Ratio  -     lisinopril (PRINIVIL,ZESTRIL) 10 MG tablet; Take 1 tablet by mouth Daily.  Dispense: 30 tablet; Refill: 11    2. Familial hyperlipidemia, high LDL  Comments:  refuses to take meds    Orders:  -     rosuvastatin (Crestor) 10 MG tablet; Take 1 tablet by mouth Daily.  Dispense: 90 tablet; Refill: 3  -     Comprehensive Metabolic Panel  -     Lipid Panel With LDL / HDL Ratio    3. Medically noncompliant    4. Primary hypertension  -     lisinopril (PRINIVIL,ZESTRIL) 10 MG tablet; Take 1 tablet by mouth Daily.  Dispense: 30 tablet; Refill: 11    5. Benign prostatic hyperplasia (BPH) with straining on urination  -     tamsulosin (FLOMAX) 0.4 MG capsule 24 hr capsule; Take 1 capsule by mouth every night at bedtime.  Dispense: 90 capsule; Refill: 3    6. Declined smoking cessation    7. Shortness of breath  -     Adult Transthoracic Echo Complete W/ Cont if Necessary Per Protocol; Future      Return in about 3 months (around 3/8/2023) for HYPERLIPIDEMIA, HYPERTENSION, DIABETES.          Answers for HPI/ROS submitted by the patient on 12/6/2022  What is the primary reason for your visit?: Other  Please describe your symptoms.: Follow up  Have you had these symptoms before?: No  How long have you been having these symptoms?: 1-4 days

## 2022-12-09 LAB
ALBUMIN SERPL-MCNC: 4.8 G/DL (ref 3.5–5.2)
ALBUMIN/GLOB SERPL: 1.8 G/DL
ALP SERPL-CCNC: 106 U/L (ref 39–117)
ALT SERPL-CCNC: 14 U/L (ref 1–41)
AST SERPL-CCNC: 11 U/L (ref 1–40)
BILIRUB SERPL-MCNC: 0.5 MG/DL (ref 0–1.2)
BUN SERPL-MCNC: 15 MG/DL (ref 8–23)
BUN/CREAT SERPL: 16.3 (ref 7–25)
CALCIUM SERPL-MCNC: 9.7 MG/DL (ref 8.6–10.5)
CHLORIDE SERPL-SCNC: 101 MMOL/L (ref 98–107)
CHOLEST SERPL-MCNC: 271 MG/DL (ref 0–200)
CO2 SERPL-SCNC: 31.6 MMOL/L (ref 22–29)
CREAT SERPL-MCNC: 0.92 MG/DL (ref 0.76–1.27)
EGFRCR SERPLBLD CKD-EPI 2021: 92.3 ML/MIN/1.73
GLOBULIN SER CALC-MCNC: 2.6 GM/DL
GLUCOSE SERPL-MCNC: 171 MG/DL (ref 65–99)
HBA1C MFR BLD: 7.1 % (ref 4.8–5.6)
HDLC SERPL-MCNC: 61 MG/DL (ref 40–60)
LDLC SERPL CALC-MCNC: 192 MG/DL (ref 0–100)
LDLC/HDLC SERPL: 3.1 {RATIO}
POTASSIUM SERPL-SCNC: 4.7 MMOL/L (ref 3.5–5.2)
PROT SERPL-MCNC: 7.4 G/DL (ref 6–8.5)
SODIUM SERPL-SCNC: 142 MMOL/L (ref 136–145)
TRIGL SERPL-MCNC: 105 MG/DL (ref 0–150)
VLDLC SERPL CALC-MCNC: 18 MG/DL (ref 5–40)

## 2022-12-27 ENCOUNTER — HOSPITAL ENCOUNTER (OUTPATIENT)
Dept: CARDIOLOGY | Facility: HOSPITAL | Age: 67
Discharge: HOME | End: 2022-12-27
Admitting: FAMILY MEDICINE

## 2022-12-27 VITALS
DIASTOLIC BLOOD PRESSURE: 60 MMHG | SYSTOLIC BLOOD PRESSURE: 140 MMHG | OXYGEN SATURATION: 95 % | HEART RATE: 100 BPM | WEIGHT: 172 LBS | BODY MASS INDEX: 27 KG/M2 | HEIGHT: 67 IN

## 2022-12-27 DIAGNOSIS — R06.02 SHORTNESS OF BREATH: ICD-10-CM

## 2022-12-27 LAB
AORTIC ARCH: 2.5 CM
ASCENDING AORTA: 2.8 CM
AV HCM GRAD VALS: 28 MMHG
AV LVOT PEAK GRADIENT: 10 MMHG
BH CV ECHO LEFT VENTRICLE GLOBAL LONGITUDINAL STRAIN: -23.4 %
BH CV ECHO MEAS - ACS: 1.89 CM
BH CV ECHO MEAS - AO MAX PG: 14.8 MMHG
BH CV ECHO MEAS - AO MEAN PG: 8.6 MMHG
BH CV ECHO MEAS - AO ROOT DIAM: 2.8 CM
BH CV ECHO MEAS - AO V2 MAX: 192.1 CM/SEC
BH CV ECHO MEAS - AO V2 VTI: 40 CM
BH CV ECHO MEAS - AVA(I,D): 2.17 CM2
BH CV ECHO MEAS - EDV(CUBED): 73.7 ML
BH CV ECHO MEAS - EDV(MOD-SP2): 65 ML
BH CV ECHO MEAS - EDV(MOD-SP4): 66 ML
BH CV ECHO MEAS - EF(MOD-BP): 61.7 %
BH CV ECHO MEAS - EF(MOD-SP2): 61.5 %
BH CV ECHO MEAS - EF(MOD-SP4): 62.1 %
BH CV ECHO MEAS - ESV(CUBED): 17.2 ML
BH CV ECHO MEAS - ESV(MOD-SP2): 25 ML
BH CV ECHO MEAS - ESV(MOD-SP4): 25 ML
BH CV ECHO MEAS - FS: 38.5 %
BH CV ECHO MEAS - IVS/LVPW: 0.98 CM
BH CV ECHO MEAS - IVSD: 1.11 CM
BH CV ECHO MEAS - LAT PEAK E' VEL: 10.3 CM/SEC
BH CV ECHO MEAS - LV DIASTOLIC VOL/BSA (35-75): 34.8 CM2
BH CV ECHO MEAS - LV MASS(C)D: 162.4 GRAMS
BH CV ECHO MEAS - LV MAX PG: 9.8 MMHG
BH CV ECHO MEAS - LV MEAN PG: 5.1 MMHG
BH CV ECHO MEAS - LV SYSTOLIC VOL/BSA (12-30): 13.2 CM2
BH CV ECHO MEAS - LV V1 MAX: 156.3 CM/SEC
BH CV ECHO MEAS - LV V1 VTI: 33.2 CM
BH CV ECHO MEAS - LVIDD: 4.2 CM
BH CV ECHO MEAS - LVIDS: 2.6 CM
BH CV ECHO MEAS - LVOT AREA: 2.6 CM2
BH CV ECHO MEAS - LVOT DIAM: 1.82 CM
BH CV ECHO MEAS - LVPWD: 1.14 CM
BH CV ECHO MEAS - MED PEAK E' VEL: 9.1 CM/SEC
BH CV ECHO MEAS - MV A DUR: 0.12 SEC
BH CV ECHO MEAS - MV A MAX VEL: 85.9 CM/SEC
BH CV ECHO MEAS - MV DEC SLOPE: 494.3 CM/SEC2
BH CV ECHO MEAS - MV DEC TIME: 0.24 MSEC
BH CV ECHO MEAS - MV E MAX VEL: 91.3 CM/SEC
BH CV ECHO MEAS - MV E/A: 1.06
BH CV ECHO MEAS - MV MAX PG: 3.9 MMHG
BH CV ECHO MEAS - MV MEAN PG: 1.77 MMHG
BH CV ECHO MEAS - MV P1/2T: 60 MSEC
BH CV ECHO MEAS - MV V2 VTI: 40.8 CM
BH CV ECHO MEAS - MVA(P1/2T): 3.7 CM2
BH CV ECHO MEAS - MVA(VTI): 2.12 CM2
BH CV ECHO MEAS - PA ACC TIME: 0.08 SEC
BH CV ECHO MEAS - PA PR(ACCEL): 41 MMHG
BH CV ECHO MEAS - PA V2 MAX: 143.9 CM/SEC
BH CV ECHO MEAS - PULM A REVS DUR: 0.1 SEC
BH CV ECHO MEAS - PULM A REVS VEL: 40.3 CM/SEC
BH CV ECHO MEAS - PULM DIAS VEL: 45.6 CM/SEC
BH CV ECHO MEAS - PULM S/D: 1.3
BH CV ECHO MEAS - PULM SYS VEL: 59.2 CM/SEC
BH CV ECHO MEAS - QP/QS: 1.18
BH CV ECHO MEAS - RAP SYSTOLE: 3 MMHG
BH CV ECHO MEAS - RV MAX PG: 5.1 MMHG
BH CV ECHO MEAS - RV V1 MAX: 113 CM/SEC
BH CV ECHO MEAS - RV V1 VTI: 23.2 CM
BH CV ECHO MEAS - RVOT DIAM: 2.37 CM
BH CV ECHO MEAS - RVSP: 19.1 MMHG
BH CV ECHO MEAS - SI(MOD-SP2): 21.1 ML/M2
BH CV ECHO MEAS - SI(MOD-SP4): 21.6 ML/M2
BH CV ECHO MEAS - SUP REN AO DIAM: 1.8 CM
BH CV ECHO MEAS - SV(LVOT): 86.7 ML
BH CV ECHO MEAS - SV(MOD-SP2): 40 ML
BH CV ECHO MEAS - SV(MOD-SP4): 41 ML
BH CV ECHO MEAS - SV(RVOT): 102.7 ML
BH CV ECHO MEAS - TAPSE (>1.6): 2.6 CM
BH CV ECHO MEAS - TR MAX PG: 16.1 MMHG
BH CV ECHO MEAS - TR MAX VEL: 200.9 CM/SEC
BH CV ECHO MEASUREMENTS AVERAGE E/E' RATIO: 9.41
BH CV XLRA - RV BASE: 3.1 CM
BH CV XLRA - RV LENGTH: 6.4 CM
BH CV XLRA - RV MID: 3.1 CM
BH CV XLRA - TDI S': 14.9 CM/SEC
LEFT ATRIUM VOLUME INDEX: 18.5 ML/M2
MAXIMAL PREDICTED HEART RATE: 155 BPM
SINUS: 3 CM
STJ: 2.7 CM
STRESS TARGET HR: 132 BPM

## 2022-12-27 PROCEDURE — 93356 MYOCRD STRAIN IMG SPCKL TRCK: CPT | Performed by: INTERNAL MEDICINE

## 2022-12-27 PROCEDURE — 93306 TTE W/DOPPLER COMPLETE: CPT | Performed by: INTERNAL MEDICINE

## 2022-12-27 PROCEDURE — 93356 MYOCRD STRAIN IMG SPCKL TRCK: CPT

## 2022-12-27 PROCEDURE — 93306 TTE W/DOPPLER COMPLETE: CPT

## 2022-12-28 ENCOUNTER — HOSPITAL ENCOUNTER (OUTPATIENT)
Dept: CT IMAGING | Facility: HOSPITAL | Age: 67
Discharge: HOME | End: 2022-12-28
Admitting: FAMILY MEDICINE

## 2022-12-28 DIAGNOSIS — F17.210 CIGARETTE SMOKER: ICD-10-CM

## 2022-12-28 PROCEDURE — 71271 CT THORAX LUNG CANCER SCR C-: CPT

## 2025-08-04 ENCOUNTER — TRANSCRIBE ORDERS (OUTPATIENT)
Dept: ADMINISTRATIVE | Facility: HOSPITAL | Age: 70
End: 2025-08-04
Payer: MEDICARE

## 2025-08-04 DIAGNOSIS — R97.20 ELEVATED PSA: Primary | ICD-10-CM
